# Patient Record
Sex: MALE | Race: WHITE | NOT HISPANIC OR LATINO | Employment: OTHER | URBAN - METROPOLITAN AREA
[De-identification: names, ages, dates, MRNs, and addresses within clinical notes are randomized per-mention and may not be internally consistent; named-entity substitution may affect disease eponyms.]

---

## 2017-02-22 ENCOUNTER — HOSPITAL ENCOUNTER (OUTPATIENT)
Dept: CARDIOLOGY | Facility: CLINIC | Age: 82
Discharge: HOME | End: 2017-02-22
Attending: INTERNAL MEDICINE | Admitting: INTERNAL MEDICINE
Payer: MEDICARE

## 2017-02-22 DIAGNOSIS — I47.19 ATRIAL TACHYCARDIA (H): ICD-10-CM

## 2017-02-22 PROCEDURE — 93227 XTRNL ECG REC<48 HR R&I: CPT | Performed by: INTERNAL MEDICINE

## 2017-02-22 PROCEDURE — 93225 XTRNL ECG REC<48 HRS REC: CPT | Performed by: INTERNAL MEDICINE

## 2017-03-03 ENCOUNTER — OFFICE VISIT (OUTPATIENT)
Dept: CARDIOLOGY | Facility: CLINIC | Age: 82
End: 2017-03-03
Attending: INTERNAL MEDICINE
Payer: MEDICARE

## 2017-03-03 VITALS
SYSTOLIC BLOOD PRESSURE: 131 MMHG | WEIGHT: 215 LBS | HEART RATE: 71 BPM | OXYGEN SATURATION: 97 % | DIASTOLIC BLOOD PRESSURE: 85 MMHG | BODY MASS INDEX: 30.85 KG/M2

## 2017-03-03 DIAGNOSIS — I47.19 ATRIAL TACHYCARDIA (H): Primary | ICD-10-CM

## 2017-03-03 PROCEDURE — 99213 OFFICE O/P EST LOW 20 MIN: CPT | Performed by: PHYSICIAN ASSISTANT

## 2017-03-03 NOTE — PATIENT INSTRUCTIONS
Thank you for your [facility] visit today. Your provider has recommended the following:  Medication Changes:  No change  Recommendations:  No testing at this time  Follow-up:  See Dr Tsai for cardiology follow up in 1 year.    We kindly ask that you call cardiology scheduling at [phone] three months prior to requested revisit date to schedule future cardiology appointments.  Reminder:  1. Please bring in your current medication list or your medication, over the counter supplements and vitamin bottles as we will review these at each office visit.               [facility] HEART CARE  [facility]~[address]~[place], [place]~[address]  Questions about your visit today?  Call your Cardiology Clinic RN's-Mary Paul and/or Janeen Martinez at [phone].

## 2017-03-03 NOTE — LETTER
3/3/2017    Hima Lott MD  [facility]   [address] [address]    RE: Vannesa Alvarez       Dear Colleague,    I had the pleasure of seeing Vannesa Alvarez in the [facility].    Mr. Alvarez is a pleasant 86-year-old gentleman who presents to Cardiology office to review the results of recent Holter monitor and for a 1-year followup.      The patient's cardiovascular history includes atrial tachyarrhythmias which may have previously caused an episode of syncope.  Since being placed on beta blockers, he has not had any recurrent syncopal episodes.      At this point, the patient states that he is doing quite well.  He has no cardiovascular symptoms.  He denies any palpitations, presyncope or syncope.  He exercises routinely on the treadmill and lifts some light weights.  He does not have any limitations with these activities.      He recently had a 24-hour Holter monitor which showed sinus arrhythmia with an average heart rate of 67 beats per minute, the minimum was 43, the maximum was 113.  He had rare isolated PVCs along with occasional isolated PACs.  He did have 9 SVT runs, the longest was 10 beats.      CURRENT CARDIAC MEDICATIONS:   1.  Toprol-XL 25 mg daily.   2.  Aspirin 81 mg daily.      The remainder of his medications, allergies and review of systems were reviewed and are as documented separately.      PHYSICAL EXAMINATION:   GENERAL:  The patient is a pleasant 86-year-old gentleman who appears his stated age.  He is in no apparent distress.   VITAL SIGNS:  His blood pressure is 130/85, pulse is 71, weight is 215 pounds.  This is overall stable.   LUNGS:  Clear to auscultation bilaterally.   CARDIAC:  Reveals a regular rate and rhythm, no murmurs appreciated.   ABDOMEN:  Soft, nontender, nondistended.   EXTREMITIES:  Lower extremities show no evidence of edema.      ASSESSMENT/PLAN:  The patient is a pleasant 86-year-old gentleman with a  history of paroxysmal atrial tachycardia.  He had an episode of syncope about 3 years ago now, which was felt possibly due to his tachycardia in the setting of dehydration.  He was placed on a beta blocker and has not had any recurrent symptoms since then.  He continues to do well from a cardiovascular standpoint and I have encouraged him to continue his current cardiac medication regimen unchanged.      He will follow up in the Cardiology Clinic in 1 year.  Of course, I encouraged him to contact us sooner with any questions or concerns.      Thank you for allowing us to participate in the care of this very pleasant patient.       Sincerely,    Ammy Harper PA-C     [facility]

## 2017-03-03 NOTE — MR AVS SNAPSHOT
After Visit Summary   3/3/2017    Vannesa Alvarez    MRN: [id number]           Patient Information     Date Of Birth          11/13/1930        Visit Information        Provider Department      3/3/2017 1:40 PM Ammy Harper PA-C [facility] PHYS[facility]ART AT [facility]        Today's Diagnoses     Atrial tachycardia (H)          Care Instructions    Thank you for your [facility] visit today. Your provider has recommended the following:  Medication Changes:  No change  Recommendations:  No testing at this time  Follow-up:  See Dr Tsai for cardiology follow up in 1 year.    We kindly ask that you call cardiology scheduling at [phone] three months prior to requested revisit date to schedule future cardiology appointments.  Reminder:  1. Please bring in your current medication list or your medication, over the counter supplements and vitamin bottles as we will review these at each office visit.               [facility]~[address] [place] [address]. 2nd Floor~[place], [place]~[address]  Questions about your visit today?  Call your Cardiology Clinic RN's-Mary Paul and/or Janeen Martinez at [phone].              Follow-ups after your visit        Your next 10 appointments already scheduled     Mar 03, 2017  1:40 PM CST   Return Visit with Ammy Harper PA-C   [facility] PHYSICIAN HEART AT [facility] ([facility])    [address] [address] [phone]              Who to contact     If you have questions or need follow up information about today's clinic visit or your schedule please contact [facility] AT [facility] directly at [phone].  Normal or non-critical lab and imaging results will be communicated to you by MyChart, letter or phone within 4 business days after the clinic has received the results. If you do not hear from us within 7  "days, please contact the clinic through [employer] or phone. If you have a critical or abnormal lab result, we will notify you by phone as soon as possible.  Submit refill requests through [employer] or call your pharmacy and they will forward the refill request to us. Please allow 3 business days for your refill to be completed.          Additional Information About Your Visit        [employer] Information     [employer] lets you send messages to your doctor, view your test results, renew your prescriptions, schedule appointments and more. To sign up, go to [email or web address] . Click on \"Log in\" on the left side of the screen, which will take you to the Welcome page. Then click on \"Sign up Now\" on the right side of the page.     You will be asked to enter the access code listed below, as well as some personal information. Please follow the directions to create your username and password.     Your access code is: [email or web address]  Expires: 2017  1:39 PM     Your access code will  in 90 days. If you need help or a new code, please call your [place] clinic or [phone].        Care EveryWhere ID     This is your Care EveryWhere ID. This could be used by other organizations to access your [place] medical records  [id number]        Your Vitals Were     Pulse Pulse Oximetry BMI (Body Mass Index)             71 97% 30.85 kg/m2          Blood Pressure from Last 3 Encounters:   17 131/85   16 123/80   04/20/15 129/87    Weight from Last 3 Encounters:   17 97.5 kg (215 lb)   16 98.9 kg (218 lb)   04/20/15 98.9 kg (218 lb)              We Performed the Following     Follow-Up with Cardiologist        Primary Care Provider Office Phone # Fax #    Hima Lott [phone]       [facility] [address] [address]        Thank you!     Thank you for choosing [facility] PHYSICIAN HEART AT [facility]  for your care. Our goal is always to " provide you with excellent care. Hearing back from our patients is one way we can continue to improve our services. Please take a few minutes to complete the written survey that you may receive in the mail after your visit with us. Thank you!             Your Updated Medication List - Protect others around you: Learn how to safely use, store and throw away your medicines at www.disposemymeds.org.          This list is accurate as of: 3/3/17  1:39 PM.  Always use your most recent med list.                   Brand Name Dispense Instructions for use    acetaminophen 325 MG tablet    TYLENOL    100 tablet    Take 2 tablets (650 mg) by mouth every 4 hours as needed for mild pain or fever       allopurinol 300 MG tablet    ZYLOPRIM     1 TABLET BY MOUTH DAILY       aspirin 81 MG chewable tablet     90 tablet    Take 1 tablet (81 mg) by mouth daily       cyanocobalamin 1000 MCG/ML injection    VITAMIN B12     Inject 1 mL into the muscle every 30 days Around the 8th. Gets at [facility]       LOZOL 2.5 MG tablet   Generic drug:  indapamide      1 TABLET BY MOUTH DAILY       metoprolol 25 MG 24 hr tablet    TOPROL-XL    90 tablet    Take 1 tablet (25 mg) by mouth daily every morning.       nystatin cream    MYCOSTATIN     Apply topically 2 times daily       potassium chloride 10 MEQ tablet    K-TAB,KLOR-CON    30 tablet    Take 1 tablet (10 mEq) by mouth daily       ZANTAC PO      Take 150 mg by mouth 2 times daily

## 2017-03-03 NOTE — PROGRESS NOTES
HISTORY OF PRESENT ILLNESS:  Mr. Alvarez is a pleasant 86-year-old gentleman who presents to Cardiology office to review the results of recent Holter monitor and for a 1-year followup.      The patient's cardiovascular history includes atrial tachyarrhythmias which may have previously caused an episode of syncope.  Since being placed on beta blockers, he has not had any recurrent syncopal episodes.      At this point, the patient states that he is doing quite well.  He has no cardiovascular symptoms.  He denies any palpitations, presyncope or syncope.  He exercises routinely on the treadmill and lifts some light weights.  He does not have any limitations with these activities.      He recently had a 24-hour Holter monitor which showed sinus arrhythmia with an average heart rate of 67 beats per minute, the minimum was 43, the maximum was 113.  He had rare isolated PVCs along with occasional isolated PACs.  He did have 9 SVT runs, the longest was 10 beats.      CURRENT CARDIAC MEDICATIONS:   1.  Toprol-XL 25 mg daily.   2.  Aspirin 81 mg daily.      The remainder of his medications, allergies and review of systems were reviewed and are as documented separately.      PHYSICAL EXAMINATION:   GENERAL:  The patient is a pleasant 86-year-old gentleman who appears his stated age.  He is in no apparent distress.   VITAL SIGNS:  His blood pressure is 130/85, pulse is 71, weight is 215 pounds.  This is overall stable.   LUNGS:  Clear to auscultation bilaterally.   CARDIAC:  Reveals a regular rate and rhythm, no murmurs appreciated.   ABDOMEN:  Soft, nontender, nondistended.   EXTREMITIES:  Lower extremities show no evidence of edema.      ASSESSMENT/PLAN:  The patient is a pleasant 86-year-old gentleman with a history of paroxysmal atrial tachycardia.  He had an episode of syncope about 3 years ago now, which was felt possibly due to his tachycardia in the setting of dehydration.  He was placed on a beta blocker and has not  had any recurrent symptoms since then.  He continues to do well from a cardiovascular standpoint and I have encouraged him to continue his current cardiac medication regimen unchanged.      He will follow up in the Cardiology Clinic in 1 year.  Of course, I encouraged him to contact us sooner with any questions or concerns.      Thank you for allowing us to participate in the care of this very pleasant patient.        cc:   Hima Lott MD   [address]  [address]         BOUBACAR RIVERA PA-C             D: 2017 13:48   T: 2017 15:46   MT: EMILI      Name:     MADISON LUKE   MRN:      [id number]        Account:      [id number]   :      1930           Service Date: 2017      Document: [id number]

## 2017-03-03 NOTE — PROGRESS NOTES
Please see separate dictation for HPI, PHYSICAL EXAM AND IMPRESSION/PLAN.    CURRENT MEDICATIONS:  Current Outpatient Prescriptions   Medication Sig Dispense Refill     metoprolol (TOPROL-XL) 25 MG 24 hr tablet Take 1 tablet (25 mg) by mouth daily every morning. 90 tablet 3     acetaminophen (TYLENOL) 325 MG tablet Take 2 tablets (650 mg) by mouth every 4 hours as needed for mild pain or fever 100 tablet      potassium chloride (K-DUR) 10 MEQ tablet Take 1 tablet (10 mEq) by mouth daily 30 tablet 0     aspirin 81 MG chewable tablet Take 1 tablet (81 mg) by mouth daily 90 tablet 3     cyanocobalamin 1000 MCG/ML injection Inject 1 mL into the muscle every 30 days Around the 8th. Gets at [facility]       nystatin (MYCOSTATIN) cream Apply topically 2 times daily       Ranitidine HCl (ZANTAC PO) Take 150 mg by mouth 2 times daily       ALLOPURINOL 300 MG OR TABS 1 TABLET BY MOUTH DAILY       LOZOL 2.5 MG OR TABS 1 TABLET BY MOUTH DAILY         ALLERGIES:     Allergies   Allergen Reactions     Latex Itching     Cipro [Ciprofloxacin] Itching and Rash       PAST MEDICAL HISTORY:  Past Medical History   Diagnosis Date     Cancer (H) 5/2014     Prostate Cancer, skin cancer     Gastro-oesophageal reflux disease      Syncope        PAST SURGICAL HISTORY:  No past surgical history on file.    SOCIAL HISTORY:  Social History     Social History     Marital status:      Spouse name: N/A     Number of children: N/A     Years of education: N/A     Social History Main Topics     Smoking status: Former Smoker     Quit date: 1/1/1969     Smokeless tobacco: Not on file     Alcohol use Not on file     Drug use: Not on file     Sexual activity: Not on file     Other Topics Concern     Not on file     Social History Narrative       FAMILY HISTORY:  No family history on file.    Review of Systems:  Skin:  Negative       Eyes:  Negative      ENT:  Negative      Respiratory:  Negative     Cardiovascular:  Negative for;chest  pain;palpitations;edema;fatigue;lightheadedness;dizziness;exercise intolerance syncope or near-syncope   Gastroenterology: Negative      Genitourinary:  Negative      Musculoskeletal:  Negative      Neurologic:  Positive for numbness or tingling of feet    Psychiatric:  Negative      Heme/Lymph/Imm:  Negative      Endocrine:  Negative         Reviewed. Remainder of the note dictated.    Ammy Harper PA-C

## 2017-08-28 DIAGNOSIS — I47.10 PAROXYSMAL SUPRAVENTRICULAR TACHYCARDIA (H): Primary | ICD-10-CM

## 2017-08-28 RX ORDER — METOPROLOL SUCCINATE 25 MG/1
25 TABLET, EXTENDED RELEASE ORAL DAILY
Qty: 90 TABLET | Refills: 2 | Status: SHIPPED | OUTPATIENT
Start: 2017-08-28 | End: 2018-05-29

## 2018-04-03 ENCOUNTER — APPOINTMENT (OUTPATIENT)
Dept: GENERAL RADIOLOGY | Facility: CLINIC | Age: 83
End: 2018-04-03
Attending: STUDENT IN AN ORGANIZED HEALTH CARE EDUCATION/TRAINING PROGRAM
Payer: MEDICARE

## 2018-04-03 ENCOUNTER — HOSPITAL ENCOUNTER (EMERGENCY)
Facility: CLINIC | Age: 83
Discharge: HOME | End: 2018-04-03
Attending: STUDENT IN AN ORGANIZED HEALTH CARE EDUCATION/TRAINING PROGRAM | Admitting: STUDENT IN AN ORGANIZED HEALTH CARE EDUCATION/TRAINING PROGRAM
Payer: MEDICARE

## 2018-04-03 VITALS
DIASTOLIC BLOOD PRESSURE: 90 MMHG | TEMPERATURE: 98.5 F | RESPIRATION RATE: 18 BRPM | SYSTOLIC BLOOD PRESSURE: 150 MMHG | HEART RATE: 98 BPM | OXYGEN SATURATION: 94 %
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
## 2018-04-03 DIAGNOSIS — R53.1 EPISODE OF GENERALIZED WEAKNESS: ICD-10-CM

## 2018-04-03 DIAGNOSIS — J20.9 ACUTE BRONCHITIS, UNSPECIFIED ORGANISM: ICD-10-CM

## 2018-04-03 LAB
ALBUMIN SERPL-MCNC: 3.4 G/DL (ref 3.4–5)
ALBUMIN UR-MCNC: NEGATIVE MG/DL
ALP SERPL-CCNC: 85 U/L (ref 40–150)
ALT SERPL W P-5'-P-CCNC: 18 U/L (ref 0–70)
ANION GAP SERPL CALCULATED.3IONS-SCNC: 5 MMOL/L (ref 3–14)
APPEARANCE UR: CLEAR
AST SERPL W P-5'-P-CCNC: 28 U/L (ref 0–45)
BASOPHILS # BLD AUTO: 0 10E9/L (ref 0–0.2)
BASOPHILS NFR BLD AUTO: 0.2 %
BILIRUB SERPL-MCNC: 0.7 MG/DL (ref 0.2–1.3)
BILIRUB UR QL STRIP: NEGATIVE
BUN SERPL-MCNC: 13 MG/DL (ref 7–30)
CALCIUM SERPL-MCNC: 8.4 MG/DL (ref 8.5–10.1)
CHLORIDE SERPL-SCNC: 99 MMOL/L (ref 94–109)
CO2 SERPL-SCNC: 32 MMOL/L (ref 20–32)
COLOR UR AUTO: YELLOW
CREAT SERPL-MCNC: 1.11 MG/DL (ref 0.66–1.25)
DIFFERENTIAL METHOD BLD: ABNORMAL
EOSINOPHIL # BLD AUTO: 0.1 10E9/L (ref 0–0.7)
EOSINOPHIL NFR BLD AUTO: 1.1 %
ERYTHROCYTE [DISTWIDTH] IN BLOOD BY AUTOMATED COUNT: 13.1 % (ref 10–15)
GFR SERPL CREATININE-BSD FRML MDRD: 63 ML/MIN/1.7M2
GLUCOSE SERPL-MCNC: 94 MG/DL (ref 70–99)
GLUCOSE UR STRIP-MCNC: NEGATIVE MG/DL
HCT VFR BLD AUTO: 40.7 % (ref 40–53)
HGB BLD-MCNC: 14.4 G/DL (ref 13.3–17.7)
HGB UR QL STRIP: NEGATIVE
IMM GRANULOCYTES # BLD: 0 10E9/L (ref 0–0.4)
IMM GRANULOCYTES NFR BLD: 0.6 %
KETONES UR STRIP-MCNC: NEGATIVE MG/DL
LEUKOCYTE ESTERASE UR QL STRIP: ABNORMAL
LYMPHOCYTES # BLD AUTO: 0.7 10E9/L (ref 0.8–5.3)
LYMPHOCYTES NFR BLD AUTO: 12.5 %
MCH RBC QN AUTO: 32.6 PG (ref 26.5–33)
MCHC RBC AUTO-ENTMCNC: 35.4 G/DL (ref 31.5–36.5)
MCV RBC AUTO: 92 FL (ref 78–100)
MONOCYTES # BLD AUTO: 0.8 10E9/L (ref 0–1.3)
MONOCYTES NFR BLD AUTO: 15.4 %
NEUTROPHILS # BLD AUTO: 3.8 10E9/L (ref 1.6–8.3)
NEUTROPHILS NFR BLD AUTO: 70.2 %
NITRATE UR QL: NEGATIVE
PH UR STRIP: 6 PH (ref 5–7)
PLATELET # BLD AUTO: 117 10E9/L (ref 150–450)
POTASSIUM SERPL-SCNC: 3.1 MMOL/L (ref 3.4–5.3)
PROT SERPL-MCNC: 7.5 G/DL (ref 6.8–8.8)
RBC # BLD AUTO: 4.42 10E12/L (ref 4.4–5.9)
RBC #/AREA URNS AUTO: 2 /HPF (ref 0–2)
SODIUM SERPL-SCNC: 136 MMOL/L (ref 133–144)
SOURCE: ABNORMAL
SP GR UR STRIP: 1.01 (ref 1–1.03)
TROPONIN I SERPL-MCNC: <0.015 UG/L (ref 0–0.04)
UROBILINOGEN UR STRIP-MCNC: 4 MG/DL (ref 0–2)
WBC # BLD AUTO: 5.4 10E9/L (ref 4–11)
WBC #/AREA URNS AUTO: 3 /HPF (ref 0–5)

## 2018-04-03 PROCEDURE — 25000125 ZZHC RX 250: Performed by: STUDENT IN AN ORGANIZED HEALTH CARE EDUCATION/TRAINING PROGRAM

## 2018-04-03 PROCEDURE — 80053 COMPREHEN METABOLIC PANEL: CPT | Performed by: STUDENT IN AN ORGANIZED HEALTH CARE EDUCATION/TRAINING PROGRAM

## 2018-04-03 PROCEDURE — 93005 ELECTROCARDIOGRAM TRACING: CPT | Performed by: STUDENT IN AN ORGANIZED HEALTH CARE EDUCATION/TRAINING PROGRAM

## 2018-04-03 PROCEDURE — A9270 NON-COVERED ITEM OR SERVICE: HCPCS | Mod: GY | Performed by: STUDENT IN AN ORGANIZED HEALTH CARE EDUCATION/TRAINING PROGRAM

## 2018-04-03 PROCEDURE — 96361 HYDRATE IV INFUSION ADD-ON: CPT | Performed by: STUDENT IN AN ORGANIZED HEALTH CARE EDUCATION/TRAINING PROGRAM

## 2018-04-03 PROCEDURE — 25000128 H RX IP 250 OP 636: Performed by: STUDENT IN AN ORGANIZED HEALTH CARE EDUCATION/TRAINING PROGRAM

## 2018-04-03 PROCEDURE — 99285 EMERGENCY DEPT VISIT HI MDM: CPT | Mod: 25 | Performed by: STUDENT IN AN ORGANIZED HEALTH CARE EDUCATION/TRAINING PROGRAM

## 2018-04-03 PROCEDURE — 84484 ASSAY OF TROPONIN QUANT: CPT | Performed by: STUDENT IN AN ORGANIZED HEALTH CARE EDUCATION/TRAINING PROGRAM

## 2018-04-03 PROCEDURE — 81001 URINALYSIS AUTO W/SCOPE: CPT | Performed by: STUDENT IN AN ORGANIZED HEALTH CARE EDUCATION/TRAINING PROGRAM

## 2018-04-03 PROCEDURE — 71046 X-RAY EXAM CHEST 2 VIEWS: CPT

## 2018-04-03 PROCEDURE — 93010 ELECTROCARDIOGRAM REPORT: CPT | Mod: Z6 | Performed by: STUDENT IN AN ORGANIZED HEALTH CARE EDUCATION/TRAINING PROGRAM

## 2018-04-03 PROCEDURE — 96360 HYDRATION IV INFUSION INIT: CPT | Performed by: STUDENT IN AN ORGANIZED HEALTH CARE EDUCATION/TRAINING PROGRAM

## 2018-04-03 PROCEDURE — 25000132 ZZH RX MED GY IP 250 OP 250 PS 637: Mod: GY | Performed by: STUDENT IN AN ORGANIZED HEALTH CARE EDUCATION/TRAINING PROGRAM

## 2018-04-03 PROCEDURE — 85025 COMPLETE CBC W/AUTO DIFF WBC: CPT | Performed by: STUDENT IN AN ORGANIZED HEALTH CARE EDUCATION/TRAINING PROGRAM

## 2018-04-03 RX ORDER — ONDANSETRON 4 MG/1
4 TABLET, ORALLY DISINTEGRATING ORAL ONCE
Status: COMPLETED | OUTPATIENT
Start: 2018-04-03 | End: 2018-04-03

## 2018-04-03 RX ORDER — INDAPAMIDE 2.5 MG/1
5 TABLET ORAL DAILY
COMMUNITY
Start: 2017-12-01

## 2018-04-03 RX ORDER — POTASSIUM CHLORIDE 1.5 G/1.58G
40 POWDER, FOR SOLUTION ORAL ONCE
Status: COMPLETED | OUTPATIENT
Start: 2018-04-03 | End: 2018-04-03

## 2018-04-03 RX ORDER — POTASSIUM CHLORIDE 750 MG/1
20 TABLET, EXTENDED RELEASE ORAL 3 TIMES DAILY
COMMUNITY
Start: 2017-11-01

## 2018-04-03 RX ORDER — AZITHROMYCIN 250 MG/1
TABLET, FILM COATED ORAL
Qty: 6 TABLET | Refills: 0 | Status: SHIPPED | OUTPATIENT
Start: 2018-04-03 | End: 2018-06-27

## 2018-04-03 RX ADMIN — SODIUM CHLORIDE, POTASSIUM CHLORIDE, SODIUM LACTATE AND CALCIUM CHLORIDE 500 ML: 600; 310; 30; 20 INJECTION, SOLUTION INTRAVENOUS at 12:43

## 2018-04-03 RX ADMIN — ONDANSETRON 4 MG: 4 TABLET, ORALLY DISINTEGRATING ORAL at 14:53

## 2018-04-03 RX ADMIN — POTASSIUM CHLORIDE 40 MEQ: 1.5 POWDER, FOR SOLUTION ORAL at 14:35

## 2018-04-03 NOTE — ED NOTES
Pt feeling nauseated after po potassium. zofran odt given. Pt wants to discharge and will return if needed.

## 2018-04-03 NOTE — ED AVS SNAPSHOT
[facility] Emergency Department    [address] [address]    Phone:  [phone]    Fax:  [phone]                                       Vannesa Alvarez   MRN: [id number]    Department:  [facility] Emergency Department   Date of Visit:  4/3/2018           Patient Information     Date Of Birth          11/13/1930        Your diagnoses for this visit were:     Acute bronchitis, unspecified organism     Episode of generalized weakness     Fall, initial encounter        You were seen by Lamont Medrano DO.      Follow-up Information     Follow up with Hima Lott MD. Schedule an appointment as soon as possible for a visit in 2 days.    Specialty:  Family Practice    Why:  Followup for reevaluation and managment plan.    Contact information:    [facility]  [address] [id number] [phone]        Discharge References/Attachments     BRONCHITIS, ANTIBIOTIC TREATMENT (ADULT) (ENGLISH)    FALL, UNCERTAIN CAUSE (ENGLISH)      24 Hour Appointment Hotline       To make an appointment at any [facility], call [phone] ([phone]). If you don't have a family doctor or clinic, we will help you find one. [facility] are conveniently located to serve the needs of you and your family.             Review of your medicines      START taking        Dose / Directions Last dose taken    azithromycin 250 MG tablet   Commonly known as:  ZITHROMAX   Quantity:  6 tablet        Two tablets first day, then one tablet daily for four days.   Refills:  0          Our records show that you are taking the medicines listed below. If these are incorrect, please call your family doctor or clinic.        Dose / Directions Last dose taken    acetaminophen 325 MG tablet   Commonly known as:  TYLENOL   Dose:  650 mg   Quantity:  100 tablet        Take 2 tablets (650 mg) by mouth every 4 hours as needed for mild pain or fever   Refills:  0        allopurinol 300 MG  tablet   Commonly known as:  ZYLOPRIM        1 TABLET BY MOUTH DAILY   Refills:  0        aspirin 81 MG chewable tablet   Dose:  81 mg   Quantity:  90 tablet        Take 1 tablet (81 mg) by mouth daily   Refills:  3        cyanocobalamin 1000 MCG/ML injection   Commonly known as:  VITAMIN B12   Dose:  1 mL        Inject 1 mL into the muscle every 30 days Around the 8th. Gets at [facility]   Refills:  0        indapamide 2.5 MG tablet   Commonly known as:  LOZOL   Dose:  5 mg        Take 5 mg by mouth daily   Refills:  0        metoprolol succinate 25 MG 24 hr tablet   Commonly known as:  TOPROL-XL   Dose:  25 mg   Quantity:  90 tablet        Take 1 tablet (25 mg) by mouth daily every morning.   Refills:  2        nystatin cream   Commonly known as:  MYCOSTATIN        Apply topically 2 times daily   Refills:  0        OMEPRAZOLE PO   Dose:  20 mg        Take 20 mg by mouth 2 times daily (before meals)   Refills:  0        potassium chloride SA 10 MEQ CR tablet   Commonly known as:  K-DUR/KLOR-CON M   Dose:  20 mEq        Take 20 mEq by mouth 2 times daily (with meals)   Refills:  0                Prescriptions were sent or printed at these locations (1 Prescription)                   [id number] [address]   [address] [place] [address]    Telephone:  [phone]   Fax:  [phone]   Hours:  Closed 10-16-08 business to [facility]                E-Prescribed (1 of 1)         azithromycin (ZITHROMAX) 250 MG tablet                Procedures and tests performed during your visit     CBC with platelets differential    Cardiac Continuous Monitoring    Comprehensive metabolic panel    EKG 12 lead    Peripheral IV: Standard    Pulse oximetry nursing    Troponin I    UA reflex to Microscopic and Culture    Vital signs    XR Chest 2 Views      Orders Needing Specimen Collection     None      Pending Results     No orders found from 4/1/2018 to 4/4/2018.             Pending Culture Results     No orders found from 4/1/2018 to 4/4/2018.            Pending Results Instructions     If you had any lab results that were not finalized at the time of your Discharge, you can call the ED Lab Result RN at [phone]. You will be contacted by this team for any positive Lab results or changes in treatment. The nurses are available 7 days a week from 10A to 6:30P.  You can leave a message 24 hours per day and they will return your call.        Test Results From Your Hospital Stay        4/3/2018  1:00 PM      Component Results     Component Value Ref Range & Units Status    WBC 5.4 4.0 - 11.0 10e9/L Final    RBC Count 4.42 4.4 - 5.9 10e12/L Final    Hemoglobin 14.4 13.3 - 17.7 g/dL Final    Hematocrit 40.7 40.0 - 53.0 % Final    MCV 92 78 - 100 fl Final    MCH 32.6 26.5 - 33.0 pg Final    MCHC 35.4 31.5 - 36.5 g/dL Final    RDW 13.1 10.0 - 15.0 % Final    Platelet Count 117 (L) 150 - 450 10e9/L Final    Diff Method Automated Method  Final    % Neutrophils 70.2 % Final    % Lymphocytes 12.5 % Final    % Monocytes 15.4 % Final    % Eosinophils 1.1 % Final    % Basophils 0.2 % Final    % Immature Granulocytes 0.6 % Final    Absolute Neutrophil 3.8 1.6 - 8.3 10e9/L Final    Absolute Lymphocytes 0.7 (L) 0.8 - 5.3 10e9/L Final    Absolute Monocytes 0.8 0.0 - 1.3 10e9/L Final    Absolute Eosinophils 0.1 0.0 - 0.7 10e9/L Final    Absolute Basophils 0.0 0.0 - 0.2 10e9/L Final    Abs Immature Granulocytes 0.0 0 - 0.4 10e9/L Final         4/3/2018  1:21 PM      Component Results     Component Value Ref Range & Units Status    Sodium 136 133 - 144 mmol/L Final    Potassium 3.1 (L) 3.4 - 5.3 mmol/L Final    Chloride 99 94 - 109 mmol/L Final    Carbon Dioxide 32 20 - 32 mmol/L Final    Anion Gap 5 3 - 14 mmol/L Final    Glucose 94 70 - 99 mg/dL Final    Urea Nitrogen 13 7 - 30 mg/dL Final    Creatinine 1.11 0.66 - 1.25 mg/dL Final    GFR Estimate 63 >60 mL/min/1.7m2 Final    Non African American GFR  Calc    GFR Estimate If Black 76 >60 mL/min/1.7m2 Final    African American GFR Calc    Calcium 8.4 (L) 8.5 - 10.1 mg/dL Final    Bilirubin Total 0.7 0.2 - 1.3 mg/dL Final    Albumin 3.4 3.4 - 5.0 g/dL Final    Protein Total 7.5 6.8 - 8.8 g/dL Final    Alkaline Phosphatase 85 40 - 150 U/L Final    ALT 18 0 - 70 U/L Final    AST 28 0 - 45 U/L Final         4/3/2018  1:21 PM      Component Results     Component Value Ref Range & Units Status    Troponin I ES <0.015 0.000 - 0.045 ug/L Final    The 99th percentile for upper reference range is 0.045 ug/L.  Troponin values   in the range of 0.045 - 0.120 ug/L may be associated with risks of adverse   clinical events.           4/3/2018  1:30 PM      Narrative     XR CHEST 2 VW 4/3/2018 1:10 PM    HISTORY: Cough. Fever.  Fall.    COMPARISON: 11/2/2014.        Impression     IMPRESSION: 2 views of the chest show no acute cardiopulmonary disease  and no significant change.     WATSON HERRERA MD         4/3/2018  1:57 PM      Component Results     Component Value Ref Range & Units Status    Color Urine Yellow  Final    Appearance Urine Clear  Final    Glucose Urine Negative NEG^Negative mg/dL Final    Bilirubin Urine Negative NEG^Negative Final    Ketones Urine Negative NEG^Negative mg/dL Final    Specific Gravity Urine 1.015 1.003 - 1.035 Final    Blood Urine Negative NEG^Negative Final    pH Urine 6.0 5.0 - 7.0 pH Final    Protein Albumin Urine Negative NEG^Negative mg/dL Final    Urobilinogen mg/dL 4.0 (H) 0.0 - 2.0 mg/dL Final    Nitrite Urine Negative NEG^Negative Final    Leukocyte Esterase Urine Small (A) NEG^Negative Final    Source Midstream Urine  Final    RBC Urine 2 0 - 2 /HPF Final    WBC Urine 3 0 - 5 /HPF Final                Thank you for choosing [facility]       Thank you for choosing [facility] for your care. Our goal is always to provide you with excellent care. Hearing back from our patients is one way we can continue to improve our services. Please take a few  "minutes to complete the written survey that you may receive in the mail after you visit with us. Thank you!        Take the [employer] Information     [facility] lets you send messages to your doctor, view your test results, renew your prescriptions, schedule appointments and more. To sign up, go to [email or web address] . Click on \"Log in\" on the left side of the screen, which will take you to the Welcome page. Then click on \"Sign up Now\" on the right side of the page.     You will be asked to enter the access code listed below, as well as some personal information. Please follow the directions to create your username and password.     Your access code is: [email or web address]  Expires: 2018  2:41 PM     Your access code will  in 90 days. If you need help or a new code, please call your [place] clinic or [phone].        Care EveryWhere ID     This is your Care EveryWhere ID. This could be used by other organizations to access your [place] medical records  [id number]        Equal Access to Services     [facility]: Hadii danielle Collins, waaxda lukamla, qaybta kaalmairina abbott, grant doyle . So [facility] [phone].    ATENCIÓN: Si habla español, tiene a robertson disposición servicios gratuitos de asistencia lingüística. Llame al [phone].    We comply with applicable federal civil rights laws and [place] laws. We do not discriminate on the basis of race, color, national origin, age, disability, sex, sexual orientation, or gender identity.            After Visit Summary       This is your record. Keep this with you and show to your community pharmacist(s) and doctor(s) at your next visit.                  "

## 2018-04-03 NOTE — ED AVS SNAPSHOT
[facility] Emergency Department    [address] [address]    Phone:  [phone]    Fax:  [phone]                                       Vannesa Alvarez   MRN: [id number]    Department:  [facility] Emergency Department   Date of Visit:  4/3/2018           After Visit Summary Signature Page     I have received my discharge instructions, and my questions have been answered. I have discussed any challenges I see with this plan with the nurse or doctor.    ..........................................................................................................................................  Patient/Patient Representative Signature      ..........................................................................................................................................  Patient Representative Print Name and Relationship to Patient    ..................................................               ................................................  Date                                            Time    ..........................................................................................................................................  Reviewed by Signature/Title    ...................................................              ..............................................  Date                                                            Time

## 2018-05-29 DIAGNOSIS — I47.10 PAROXYSMAL SUPRAVENTRICULAR TACHYCARDIA (H): ICD-10-CM

## 2018-05-29 RX ORDER — METOPROLOL SUCCINATE 25 MG/1
25 TABLET, EXTENDED RELEASE ORAL DAILY
Qty: 90 TABLET | Refills: 0 | Status: SHIPPED | OUTPATIENT
Start: 2018-05-29 | End: 2018-08-27

## 2018-06-27 ENCOUNTER — OFFICE VISIT (OUTPATIENT)
Dept: CARDIOLOGY | Facility: CLINIC | Age: 83
End: 2018-06-27
Attending: PHYSICIAN ASSISTANT
Payer: MEDICARE

## 2018-06-27 VITALS
SYSTOLIC BLOOD PRESSURE: 141 MMHG | WEIGHT: 210 LBS | OXYGEN SATURATION: 96 % | HEART RATE: 71 BPM | BODY MASS INDEX: 30.13 KG/M2 | DIASTOLIC BLOOD PRESSURE: 91 MMHG

## 2018-06-27 DIAGNOSIS — I47.19 ATRIAL TACHYCARDIA (H): Primary | ICD-10-CM

## 2018-06-27 DIAGNOSIS — R55 SYNCOPE, UNSPECIFIED SYNCOPE TYPE: ICD-10-CM

## 2018-06-27 PROCEDURE — 99213 OFFICE O/P EST LOW 20 MIN: CPT | Performed by: INTERNAL MEDICINE

## 2018-06-27 NOTE — LETTER
6/27/2018    Hima Lott MD  [facility] [address] [address]    RE: Vannesa Alvarez       Dear Colleague,    I had the pleasure of seeing Vannesa Alvarez in the [facility].    HPI and Plan:   See dictation    Orders Placed This Encounter   Procedures     Follow-Up with Cardiologist     Holter Monitor 24 hour - Adult       No orders of the defined types were placed in this encounter.      Medications Discontinued During This Encounter   Medication Reason     azithromycin (ZITHROMAX) 250 MG tablet          Encounter Diagnoses   Name Primary?     Atrial tachycardia (H) Yes     Syncope, unspecified syncope type        CURRENT MEDICATIONS:  Current Outpatient Prescriptions   Medication Sig Dispense Refill     ALLOPURINOL 300 MG OR TABS 1 TABLET BY MOUTH DAILY       aspirin 81 MG chewable tablet Take 1 tablet (81 mg) by mouth daily 90 tablet 3     cyanocobalamin 1000 MCG/ML injection Inject 1 mL into the muscle every 30 days Around the 8th. Gets at [facility]       indapamide (LOZOL) 2.5 MG tablet Take 5 mg by mouth daily       metoprolol succinate (TOPROL-XL) 25 MG 24 hr tablet Take 1 tablet (25 mg) by mouth daily every morning. 90 tablet 0     nystatin (MYCOSTATIN) cream Apply topically 2 times daily       OMEPRAZOLE PO Take 20 mg by mouth 2 times daily (before meals)       potassium chloride SA (K-DUR/KLOR-CON M) 10 MEQ CR tablet Take 20 mEq by mouth 3 times daily        acetaminophen (TYLENOL) 325 MG tablet Take 2 tablets (650 mg) by mouth every 4 hours as needed for mild pain or fever (Patient not taking: Reported on 6/27/2018) 100 tablet        ALLERGIES     Allergies   Allergen Reactions     Latex Itching     Cipro [Ciprofloxacin] Itching and Rash       PAST MEDICAL HISTORY:  Past Medical History:   Diagnosis Date     Cancer (H) 5/2014    Prostate Cancer, skin cancer     Gastro-oesophageal reflux disease      Syncope        PAST SURGICAL  HISTORY:  No past surgical history on file.    FAMILY HISTORY:  No family history on file.    SOCIAL HISTORY:  Social History     Social History     Marital status:      Spouse name: N/A     Number of children: N/A     Years of education: N/A     Social History Main Topics     Smoking status: Former Smoker     Quit date: 1/1/1969     Smokeless tobacco: Never Used     Alcohol use None     Drug use: None     Sexual activity: Not Asked     Other Topics Concern     None     Social History Narrative       Review of Systems:  Skin:  Negative       Eyes:  Negative      ENT:  Negative      Respiratory:  Positive for cough     Cardiovascular:  chest pain;Negative for;palpitations;edema;fatigue;lightheadedness;dizziness;exercise intolerance;syncope or near-syncope      Gastroenterology: Negative      Genitourinary:  Negative      Musculoskeletal:  Negative      Neurologic:  Positive for numbness or tingling of feet    Psychiatric:  Negative      Heme/Lymph/Imm:  Negative      Endocrine:  Negative        Physical Exam:  Vitals: BP (!) 141/91  Pulse 71  Wt 95.3 kg (210 lb)  SpO2 96%  BMI 30.13 kg/m2    Constitutional:  cooperative;in no acute distress        Skin:  warm and dry to the touch          Head:  normocephalic        Eyes:  sclera white        Lymph:No Cervical lymphadenopathy present     ENT:  no pallor or cyanosis        Neck:  no stiffness        Respiratory:  clear to auscultation         Cardiac: regular rhythm;normal S1 and S2       systolic ejection murmur        pulses full and equal                                        GI:  abdomen soft        Extremities and Muscular Skeletal:  no edema              Neurological:  affect appropriate;no gross motor deficits        Psych:  Alert and Oriented x 3        CC  Ammy Harper PA-C  [address] [place] [address]                Thank you for allowing me to participate in the care of your patient.      Sincerely,     Bill Tsai MD      [facility]    cc:   Ammy Harper PA-C  [address] [place] [address]

## 2018-06-27 NOTE — PROGRESS NOTES
Service Date: 2018      HISTORY OF PRESENT ILLNESS:  Mr. Luke is a pleasant 87-year-old gentleman with a history of atrial tachyarrhythmias which previously caused an episode of syncope along with dehydration.  Since being placed on beta blockers, he has had no recurrent syncopal episodes.  He returns in annual followup.      The patient has done well over the last 1 year.  We did not perform a monitor prior to today's visit.  He has had no recurrent syncope.  He has had no palpitations.  On exam, his heart rate is regular.  He denies any chest pain, pressure, shortness of breath, orthopnea or paroxysmal nocturnal dyspnea.      His blood pressure is slightly elevated in the office today at 141/91.  However, he states that at home his blood pressure is typically 130 or less.      Please see my separate note with his full physical examination.      IMPRESSION AND PLAN:  Mr. Luke is a pleasant 87-year-old gentleman with atrial tachyarrhythmias resulting in syncope in the past.  He has had no recurrent syncopal episodes and has had no recurrent significant atrial arrhythmia since being placed on low dose beta blocker therapy.  I will continue his beta blocker unchanged at the present time.  I will ask Vannesa to come back in 1 year with a Holter monitor to be completed at that time.         HILL GREEN MD             D: 2018   T: 2018   MT: EMILI      Name:     VANNESA LUKE   MRN:      [id number]        Account:      [id number]   :      1930           Service Date: 2018      Document: [id number]

## 2018-06-27 NOTE — MR AVS SNAPSHOT
After Visit Summary   6/27/2018    Vannesa Alvarez    MRN: [id number]           Patient Information     Date Of Birth          11/13/1930        Visit Information        Provider Department      6/27/2018 2:30 PM Bill Tsai MD [facility]        Today's Diagnoses     Atrial tachycardia (H)    -  1    Syncope, unspecified syncope type           Follow-ups after your visit        Additional Services     Follow-Up with Cardiologist                 Your next 10 appointments already scheduled     Jun 27, 2018  2:30 PM CDT   Return Visit with Bill Tsai MD   [facility] ([facility])    [address] [address]   [phone]              Future tests that were ordered for you today     Open Future Orders        Priority Expected Expires Ordered    Holter Monitor 24 hour - Adult Routine 6/27/2019 6/28/2019 6/27/2018    Follow-Up with Cardiologist Routine 6/27/2019 6/28/2019 6/27/2018            Who to contact     If you have questions or need follow up information about today's clinic visit or your schedule please contact [facility] directly at [phone].  Normal or non-critical lab and imaging results will be communicated to you by MyChart, letter or phone within 4 business days after the clinic has received the results. If you do not hear from us within 7 days, please contact the clinic through MyChart or phone. If you have a critical or abnormal lab result, we will notify you by phone as soon as possible.  Submit refill requests through [facility] or call your pharmacy and they will forward the refill request to us. Please allow 3 business days for your refill to be completed.          Additional Information About Your Visit        Care EveryWhere ID     This is your Care EveryWhere ID. This could be used by other organizations to access your [facility]  medical records  [id number]        Your Vitals Were     Pulse Pulse Oximetry BMI (Body Mass Index)             71 96% 30.13 kg/m2          Blood Pressure from Last 3 Encounters:   06/27/18 (!) 141/91   04/03/18 150/90   03/03/17 131/85    Weight from Last 3 Encounters:   06/27/18 95.3 kg (210 lb)   03/03/17 97.5 kg (215 lb)   03/30/16 98.9 kg (218 lb)              We Performed the Following     Follow-Up with Cardiologist          Today's Medication Changes          These changes are accurate as of 6/27/18  2:26 PM.  If you have any questions, ask your nurse or doctor.               Stop taking these medicines if you haven't already. Please contact your care team if you have questions.     azithromycin 250 MG tablet   Commonly known as:  ZITHROMAX   Stopped by:  Bill Tsai MD                    Primary Care Provider Office Phone # Fax #    Hima Lott [phone]       [place] [address] [place] [address]        Equal Access to Services     [facility]: Hadii danielle de jesus Sogris, waaxda luqadaha, qaybta kaalmairina abbott, grant doyle . So [facility] [phone].    ATENCIÓN: Si habla español, tiene a robertson disposición servicios gratuitos de asistencia lingüística. Llame al [phone].    We comply with applicable federal civil rights laws and [place] laws. We do not discriminate on the basis of race, color, national origin, age, disability, sex, sexual orientation, or gender identity.            Thank you!     Thank you for choosing [facility]  for your care. Our goal is always to provide you with excellent care. Hearing back from our patients is one way we can continue to improve our services. Please take a few minutes to complete the written survey that you may receive in the mail after your visit with us. Thank you!             Your Updated Medication List - Protect others around you: Learn  how to safely use, store and throw away your medicines at www.disposemymeds.org.          This list is accurate as of 6/27/18  2:26 PM.  Always use your most recent med list.                   Brand Name Dispense Instructions for use Diagnosis    acetaminophen 325 MG tablet    TYLENOL    100 tablet    Take 2 tablets (650 mg) by mouth every 4 hours as needed for mild pain or fever    Pneumonia, BPH (benign prostatic hypertrophy)       allopurinol 300 MG tablet    ZYLOPRIM     1 TABLET BY MOUTH DAILY        aspirin 81 MG chewable tablet     90 tablet    Take 1 tablet (81 mg) by mouth daily    Other specified cardiac dysrhythmias(427.89)       cyanocobalamin 1000 MCG/ML injection    VITAMIN B12     Inject 1 mL into the muscle every 30 days Around the 8th. Gets at [facility]        indapamide 2.5 MG tablet    LOZOL     Take 5 mg by mouth daily        metoprolol succinate 25 MG 24 hr tablet    TOPROL-XL    90 tablet    Take 1 tablet (25 mg) by mouth daily every morning.    Paroxysmal supraventricular tachycardia (H)       nystatin cream    MYCOSTATIN     Apply topically 2 times daily        OMEPRAZOLE PO      Take 20 mg by mouth 2 times daily (before meals)        potassium chloride SA 10 MEQ CR tablet    K-DUR/KLOR-CON M     Take 20 mEq by mouth 3 times daily

## 2018-06-27 NOTE — LETTER
2018      Hima Lott MD  [facility] [address] [address]      RE: Madison Luke       Dear Colleague,    I had the pleasure of seeing Madison Luke in the [facility].    Service Date: 2018      HISTORY OF PRESENT ILLNESS:  Mr. Luke is a pleasant 87-year-old gentleman with a history of atrial tachyarrhythmias which previously caused an episode of syncope along with dehydration.  Since being placed on beta blockers, he has had no recurrent syncopal episodes.  He returns in annual followup.      The patient has done well over the last 1 year.  We did not perform a monitor prior to today's visit.  He has had no recurrent syncope.  He has had no palpitations.  On exam, his heart rate is regular.  He denies any chest pain, pressure, shortness of breath, orthopnea or paroxysmal nocturnal dyspnea.      His blood pressure is slightly elevated in the office today at 141/91.  However, he states that at home his blood pressure is typically 130 or less.      Please see my separate note with his full physical examination.      IMPRESSION AND PLAN:  Mr. Luke is a pleasant 87-year-old gentleman with atrial tachyarrhythmias resulting in syncope in the past.  He has had no recurrent syncopal episodes and has had no recurrent significant atrial arrhythmia since being placed on low dose beta blocker therapy.  I will continue his beta blocker unchanged at the present time.  I will ask Madison to come back in 1 year with a Holter monitor to be completed at that time.         HILL GREEN MD             D: 2018   T: 2018   MT: EMILI      Name:     MADISON LUKE   MRN:      [id number]        Account:      [id number]   :      1930           Service Date: 2018      Document: [id number]      Outpatient Encounter Prescriptions as of 2018   Medication Sig Dispense Refill     ALLOPURINOL 300 MG OR TABS 1 TABLET BY MOUTH DAILY        aspirin 81 MG chewable tablet Take 1 tablet (81 mg) by mouth daily 90 tablet 3     cyanocobalamin 1000 MCG/ML injection Inject 1 mL into the muscle every 30 days Around the 8th. Gets at [facility]       indapamide (LOZOL) 2.5 MG tablet Take 5 mg by mouth daily       metoprolol succinate (TOPROL-XL) 25 MG 24 hr tablet Take 1 tablet (25 mg) by mouth daily every morning. 90 tablet 0     nystatin (MYCOSTATIN) cream Apply topically 2 times daily       OMEPRAZOLE PO Take 20 mg by mouth 2 times daily (before meals)       potassium chloride SA (K-DUR/KLOR-CON M) 10 MEQ CR tablet Take 20 mEq by mouth 3 times daily        acetaminophen (TYLENOL) 325 MG tablet Take 2 tablets (650 mg) by mouth every 4 hours as needed for mild pain or fever (Patient not taking: Reported on 6/27/2018) 100 tablet      [DISCONTINUED] azithromycin (ZITHROMAX) 250 MG tablet Two tablets first day, then one tablet daily for four days. 6 tablet 0     No facility-administered encounter medications on file as of 6/27/2018.      Again, thank you for allowing me to participate in the care of your patient.      Sincerely,    Bill Tsai MD     [facility]

## 2018-06-27 NOTE — PROGRESS NOTES
HPI and Plan:   See dictation    Orders Placed This Encounter   Procedures     Follow-Up with Cardiologist     Holter Monitor 24 hour - Adult       No orders of the defined types were placed in this encounter.      Medications Discontinued During This Encounter   Medication Reason     azithromycin (ZITHROMAX) 250 MG tablet          Encounter Diagnoses   Name Primary?     Atrial tachycardia (H) Yes     Syncope, unspecified syncope type        CURRENT MEDICATIONS:  Current Outpatient Prescriptions   Medication Sig Dispense Refill     ALLOPURINOL 300 MG OR TABS 1 TABLET BY MOUTH DAILY       aspirin 81 MG chewable tablet Take 1 tablet (81 mg) by mouth daily 90 tablet 3     cyanocobalamin 1000 MCG/ML injection Inject 1 mL into the muscle every 30 days Around the 8th. Gets at [facility]       indapamide (LOZOL) 2.5 MG tablet Take 5 mg by mouth daily       metoprolol succinate (TOPROL-XL) 25 MG 24 hr tablet Take 1 tablet (25 mg) by mouth daily every morning. 90 tablet 0     nystatin (MYCOSTATIN) cream Apply topically 2 times daily       OMEPRAZOLE PO Take 20 mg by mouth 2 times daily (before meals)       potassium chloride SA (K-DUR/KLOR-CON M) 10 MEQ CR tablet Take 20 mEq by mouth 3 times daily        acetaminophen (TYLENOL) 325 MG tablet Take 2 tablets (650 mg) by mouth every 4 hours as needed for mild pain or fever (Patient not taking: Reported on 6/27/2018) 100 tablet        ALLERGIES     Allergies   Allergen Reactions     Latex Itching     Cipro [Ciprofloxacin] Itching and Rash       PAST MEDICAL HISTORY:  Past Medical History:   Diagnosis Date     Cancer (H) 5/2014    Prostate Cancer, skin cancer     Gastro-oesophageal reflux disease      Syncope        PAST SURGICAL HISTORY:  No past surgical history on file.    FAMILY HISTORY:  No family history on file.    SOCIAL HISTORY:  Social History     Social History     Marital status:      Spouse name: N/A     Number of children: N/A     Years of education: N/A      Social History Main Topics     Smoking status: Former Smoker     Quit date: 1/1/1969     Smokeless tobacco: Never Used     Alcohol use None     Drug use: None     Sexual activity: Not Asked     Other Topics Concern     None     Social History Narrative       Review of Systems:  Skin:  Negative       Eyes:  Negative      ENT:  Negative      Respiratory:  Positive for cough     Cardiovascular:  chest pain;Negative for;palpitations;edema;fatigue;lightheadedness;dizziness;exercise intolerance;syncope or near-syncope      Gastroenterology: Negative      Genitourinary:  Negative      Musculoskeletal:  Negative      Neurologic:  Positive for numbness or tingling of feet    Psychiatric:  Negative      Heme/Lymph/Imm:  Negative      Endocrine:  Negative        Physical Exam:  Vitals: BP (!) 141/91  Pulse 71  Wt 95.3 kg (210 lb)  SpO2 96%  BMI 30.13 kg/m2    Constitutional:  cooperative;in no acute distress        Skin:  warm and dry to the touch          Head:  normocephalic        Eyes:  sclera white        Lymph:No Cervical lymphadenopathy present     ENT:  no pallor or cyanosis        Neck:  no stiffness        Respiratory:  clear to auscultation         Cardiac: regular rhythm;normal S1 and S2       systolic ejection murmur        pulses full and equal                                        GI:  abdomen soft        Extremities and Muscular Skeletal:  no edema              Neurological:  affect appropriate;no gross motor deficits        Psych:  Alert and Oriented x 3        CC  Ammy Harper PA-C  [address] [place] [address]

## 2018-08-27 DIAGNOSIS — I47.10 PAROXYSMAL SUPRAVENTRICULAR TACHYCARDIA (H): ICD-10-CM

## 2018-08-27 RX ORDER — METOPROLOL SUCCINATE 25 MG/1
25 TABLET, EXTENDED RELEASE ORAL DAILY
Qty: 90 TABLET | Refills: 3 | Status: SHIPPED | OUTPATIENT
Start: 2018-08-27 | End: 2022-03-25 | Stop reason: ALTCHOICE

## 2019-08-29 ENCOUNTER — HOSPITAL ENCOUNTER (OUTPATIENT)
Dept: CARDIOLOGY | Facility: CLINIC | Age: 84
Discharge: HOME | End: 2019-08-29
Attending: FAMILY MEDICINE | Admitting: FAMILY MEDICINE
Payer: MEDICARE

## 2019-08-29 DIAGNOSIS — I47.19 ATRIAL TACHYCARDIA (H): ICD-10-CM

## 2019-08-29 PROCEDURE — 93227 XTRNL ECG REC<48 HR R&I: CPT | Performed by: INTERNAL MEDICINE

## 2019-08-29 PROCEDURE — 93225 XTRNL ECG REC<48 HRS REC: CPT

## 2019-09-03 NOTE — RESULT ENCOUNTER NOTE
Results noted: principle rhythm sinus  with avg HR 66; 742 isolated SVE beats and 52 SVE runs the longest of which was 8 beats; 2019 isolated VEs (2% burden); patient made four diary entries/button presses while wearing the monitor, but did not mention any symptoms. To be discussed at  with Dr Khan on 9/4/19.

## 2019-09-04 ENCOUNTER — OFFICE VISIT (OUTPATIENT)
Dept: CARDIOLOGY | Facility: CLINIC | Age: 84
End: 2019-09-04
Payer: MEDICARE

## 2019-09-04 ENCOUNTER — HOSPITAL ENCOUNTER (EMERGENCY)
Facility: CLINIC | Age: 84
Discharge: HOME | End: 2019-09-04
Attending: NURSE PRACTITIONER | Admitting: NURSE PRACTITIONER
Payer: MEDICARE

## 2019-09-04 VITALS
WEIGHT: 204 LBS | TEMPERATURE: 97.3 F | DIASTOLIC BLOOD PRESSURE: 82 MMHG | HEIGHT: 69 IN | SYSTOLIC BLOOD PRESSURE: 146 MMHG | OXYGEN SATURATION: 97 % | BODY MASS INDEX: 30.21 KG/M2

## 2019-09-04 VITALS
BODY MASS INDEX: 29.33 KG/M2 | HEART RATE: 67 BPM | WEIGHT: 204.4 LBS | OXYGEN SATURATION: 94 % | SYSTOLIC BLOOD PRESSURE: 117 MMHG | DIASTOLIC BLOOD PRESSURE: 70 MMHG

## 2019-09-04 DIAGNOSIS — S91.209A AVULSION OF TOENAIL OF LEFT FOOT: ICD-10-CM

## 2019-09-04 DIAGNOSIS — I47.19 ATRIAL TACHYCARDIA (H): ICD-10-CM

## 2019-09-04 PROCEDURE — 11730 AVULSION NAIL PLATE SIMPLE 1: CPT | Mod: TA | Performed by: NURSE PRACTITIONER

## 2019-09-04 PROCEDURE — 25000132 ZZH RX MED GY IP 250 OP 250 PS 637: Mod: GY | Performed by: NURSE PRACTITIONER

## 2019-09-04 PROCEDURE — 99284 EMERGENCY DEPT VISIT MOD MDM: CPT | Mod: 25 | Performed by: NURSE PRACTITIONER

## 2019-09-04 PROCEDURE — 99283 EMERGENCY DEPT VISIT LOW MDM: CPT | Mod: 25 | Performed by: NURSE PRACTITIONER

## 2019-09-04 PROCEDURE — 99213 OFFICE O/P EST LOW 20 MIN: CPT | Performed by: INTERNAL MEDICINE

## 2019-09-04 RX ORDER — ACETAMINOPHEN 325 MG/1
975 TABLET ORAL ONCE
Status: COMPLETED | OUTPATIENT
Start: 2019-09-04 | End: 2019-09-04

## 2019-09-04 RX ADMIN — ACETAMINOPHEN 975 MG: 325 TABLET, FILM COATED ORAL at 10:54

## 2019-09-04 ASSESSMENT — MIFFLIN-ST. JEOR: SCORE: 1585.72

## 2019-09-04 NOTE — LETTER
9/4/2019    Hima Lott MD  [facility] [address] [facility] [address]    RE: Vannesa Alvarez       Dear Colleague,    I had the pleasure of seeing Vannesa Alvarez in the [facility].    HPI and Plan:   Today I had the pleasure of seeing Vannesa Alvarez at [facility] in [facility]. He is a pleasant 88 year old patient with a history of atrial tachyarrhythmias which previously caused an episode of syncope along with dehydration.  Since being placed on beta blockers, he has had no recurrent syncopal episodes.  He returns in annual followup.      The patient has done well over the last 1 year.   He most recently underwent Holter monitor for rhythm monitoring which showed PVC burden of 2% and SVE burden of 1%.  The first SVT was at the rate of 138 bpm and lasted 5 beats.  Overall, this is mostly unchanged from the Holter monitor performed in 2017. He has had no recurrent syncope or palpitations.  On exam, his heart rate is regular.  He denies any chest pain, pressure, shortness of breath, orthopnea or paroxysmal nocturnal dyspnea.     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  He had an echocardiogram in 03/2016 which was normal.  LVEF was 55 to 60% and no wall motion abnormalities or valvular disease were noted.    Assessment and plan  1.  History of atrial tachycardia/SVT  2.  PVCs  3.  Hypertension-well-controlled  4.  Incomplete RBBB and LAFB    The patient is currently doing well from a cardiovascular standpoint.  I will continue him on the current dose of AV loida blockage and have him come back and see me in a year.  I would prefer not to uptitrate metoprolol given incomplete right bundle branch block and left atrial fascicular block.    Thank you for allowing me to participate in the care of Vannesa URENA  Antonio Khan MD  Cardiology    Orders Placed This Encounter   Procedures     Follow-Up with Cardiologist     Encounter Diagnosis   Name Primary?     Atrial tachycardia (H)        CURRENT MEDICATIONS:  Current Outpatient Medications   Medication Sig Dispense Refill     acetaminophen (TYLENOL) 325 MG tablet Take 2 tablets (650 mg) by mouth every 4 hours as needed for mild pain or fever 100 tablet      ALLOPURINOL 300 MG OR TABS 1 TABLET BY MOUTH DAILY       aspirin 81 MG chewable tablet Take 1 tablet (81 mg) by mouth daily 90 tablet 3     cyanocobalamin 1000 MCG/ML injection Inject 1 mL into the muscle every 30 days Around the 8th. Gets at [facility]       indapamide (LOZOL) 2.5 MG tablet Take 5 mg by mouth daily       metoprolol succinate (TOPROL-XL) 25 MG 24 hr tablet Take 1 tablet (25 mg) by mouth daily every morning. 90 tablet 3     nystatin (MYCOSTATIN) cream Apply topically 2 times daily       OMEPRAZOLE PO Take 20 mg by mouth 2 times daily (before meals)       potassium chloride SA (K-DUR/KLOR-CON M) 10 MEQ CR tablet Take 20 mEq by mouth 3 times daily          ALLERGIES     Allergies   Allergen Reactions     Latex Itching     Cipro [Ciprofloxacin] Itching and Rash       PAST MEDICAL HISTORY:  Past Medical History:   Diagnosis Date     Cancer (H) 5/2014    Prostate Cancer, skin cancer     Gastro-oesophageal reflux disease      Syncope        PAST SURGICAL HISTORY:  No past surgical history on file.    FAMILY HISTORY:  No family history on file.    SOCIAL HISTORY:  Social History     Socioeconomic History     Marital status:      Spouse name: None     Number of children: None     Years of education: None     Highest education level: None   Occupational History     None   Social Needs     Financial resource strain: None     Food insecurity:     Worry: None     Inability: None     Transportation needs:     Medical: None     Non-medical: None   Tobacco Use     Smoking status: Former Smoker      Last attempt to quit: 1969     Years since quittin.7     Smokeless tobacco: Never Used   Substance and Sexual Activity     Alcohol use: None     Drug use: None     Sexual activity: None   Lifestyle     Physical activity:     Days per week: None     Minutes per session: None     Stress: None   Relationships     Social connections:     Talks on phone: None     Gets together: None     Attends Judaism service: None     Active member of club or organization: None     Attends meetings of clubs or organizations: None     Relationship status: None     Intimate partner violence:     Fear of current or ex partner: None     Emotionally abused: None     Physically abused: None     Forced sexual activity: None   Other Topics Concern     Parent/sibling w/ CABG, MI or angioplasty before 65F 55M? Not Asked   Social History Narrative     None       Review of Systems:  Skin:  Positive for bruising     Eyes:  Negative      ENT:  Negative      Respiratory:  Positive for cough     Cardiovascular:  Negative      Gastroenterology: Positive for excessive gas or bloating    Genitourinary:  Negative      Musculoskeletal:  Negative      Neurologic:  Positive for numbness or tingling of feet;tremors;involuntary movement    Psychiatric:  Negative      Heme/Lymph/Imm:  Negative      Endocrine:  Negative        Physical Exam:  Vitals: /70 (BP Location: Right arm, Patient Position: Sitting, Cuff Size: Adult Regular)   Pulse 67   Wt 92.7 kg (204 lb 6.4 oz)   SpO2 94%   BMI 29.33 kg/m      Constitutional: awake, alert, no distress  Skin: Warm and dry to touch  Head: Normocephalic, atraumatic  Eyes: Conjunctivae and lids unremarkable, sclera white  ENT: No pallor or cyanosis  Respiratory: Normal breath sounds, clear to auscultation  Cardiac: Regular rate and rhythm, S1-S2 normal.  No murmurs gallops or rubs.   No pedal edema.   Extremities and musculoskeletal: No gross motor deficit  Neurological.  Affect normal  Psych: Alert  and oriented x3    Recent Lab Results:  LIPID RESULTS:  Lab Results   Component Value Date    CHOL 152 09/03/2014    HDL 50 09/03/2014    LDL 76 09/03/2014    TRIG 129 09/03/2014    CHOLHDLRATIO 3.0 09/03/2014       LIVER ENZYME RESULTS:  Lab Results   Component Value Date    AST 28 04/03/2018    ALT 18 04/03/2018       CBC RESULTS:  Lab Results   Component Value Date    WBC 5.4 04/03/2018    RBC 4.42 04/03/2018    HGB 14.4 04/03/2018    HCT 40.7 04/03/2018    MCV 92 04/03/2018    MCH 32.6 04/03/2018    MCHC 35.4 04/03/2018    RDW 13.1 04/03/2018     (L) 04/03/2018       BMP RESULTS:  Lab Results   Component Value Date     04/03/2018    POTASSIUM 3.1 (L) 04/03/2018    CHLORIDE 99 04/03/2018    CO2 32 04/03/2018    ANIONGAP 5 04/03/2018    GLC 94 04/03/2018    BUN 13 04/03/2018    CR 1.11 04/03/2018    GFRESTIMATED 63 04/03/2018    GFRESTBLACK 76 04/03/2018    VANIA 8.4 (L) 04/03/2018        A1C RESULTS:  No results found for: A1C    INR RESULTS:  No results found for: INR        All medical records were reviewed in detail and discussed with the patient. Greater than 30 mins were spent with the patient, 50% of this time was spent on counseling and coordination of care.  After visit summary was printed and given to the patient.        Thank you for allowing me to participate in the care of your patient.    Sincerely,     Shantanu Khan MD     [facility]

## 2019-09-04 NOTE — DISCHARGE INSTRUCTIONS
You may take Tylenol thousand milligrams 3-4 times daily as needed for pain management.  Elevate the foot if it begins to throb.  Tomorrow morning shower and remove the dressing leave open to air for healing.  You may cover with a Band-Aid or ointment as desired but leaving open to air is best.  Wear the postop shoe to protect the toe.  Please wear socks until the toenail has completely started to grow again.  Follow-up if concerns of infection.

## 2019-09-04 NOTE — PROGRESS NOTES
HPI and Plan:   Today I had the pleasure of seeing Vannesa Alvarez at [facility] clinic in [facility]. He is a pleasant 88 year old patient with a history of atrial tachyarrhythmias which previously caused an episode of syncope along with dehydration.  Since being placed on beta blockers, he has had no recurrent syncopal episodes.  He returns in annual followup.      The patient has done well over the last 1 year.  He most recently underwent Holter monitor for rhythm monitoring which showed PVC burden of 2% and SVE burden of 1%.  The first SVT was at the rate of 138 bpm and lasted 5 beats.  Overall, this is mostly unchanged from the Holter monitor performed in 2017. He has had no recurrent syncope or palpitations.  On exam, his heart rate is regular.  He denies any chest pain, pressure, shortness of breath, orthopnea or paroxysmal nocturnal dyspnea.     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  He had an echocardiogram in 03/2016 which was normal.  LVEF was 55 to 60% and no wall motion abnormalities or valvular disease were noted.    Assessment and plan  1.  History of atrial tachycardia/SVT  2.  PVCs  3.  Hypertension-well-controlled  4.  Incomplete RBBB and LAFB    The patient is currently doing well from a cardiovascular standpoint.  I will continue him on the current dose of AV loida blockage and have him come back and see me in a year.  I would prefer not to uptitrate metoprolol given incomplete right bundle branch block and left atrial fascicular block.    Thank you for allowing me to participate in the care of Vannesa Alvarez    Shantanu Khan MD  Cardiology    Orders Placed This Encounter   Procedures     Follow-Up with Cardiologist     Encounter Diagnosis   Name Primary?     Atrial tachycardia (H)        CURRENT MEDICATIONS:  Current Outpatient Medications   Medication Sig  Dispense Refill     acetaminophen (TYLENOL) 325 MG tablet Take 2 tablets (650 mg) by mouth every 4 hours as needed for mild pain or fever 100 tablet      ALLOPURINOL 300 MG OR TABS 1 TABLET BY MOUTH DAILY       aspirin 81 MG chewable tablet Take 1 tablet (81 mg) by mouth daily 90 tablet 3     cyanocobalamin 1000 MCG/ML injection Inject 1 mL into the muscle every 30 days Around the 8th. Gets at [facility]       indapamide (LOZOL) 2.5 MG tablet Take 5 mg by mouth daily       metoprolol succinate (TOPROL-XL) 25 MG 24 hr tablet Take 1 tablet (25 mg) by mouth daily every morning. 90 tablet 3     nystatin (MYCOSTATIN) cream Apply topically 2 times daily       OMEPRAZOLE PO Take 20 mg by mouth 2 times daily (before meals)       potassium chloride SA (K-DUR/KLOR-CON M) 10 MEQ CR tablet Take 20 mEq by mouth 3 times daily          ALLERGIES     Allergies   Allergen Reactions     Latex Itching     Cipro [Ciprofloxacin] Itching and Rash       PAST MEDICAL HISTORY:  Past Medical History:   Diagnosis Date     Cancer (H) 2014    Prostate Cancer, skin cancer     Gastro-oesophageal reflux disease      Syncope        PAST SURGICAL HISTORY:  No past surgical history on file.    FAMILY HISTORY:  No family history on file.    SOCIAL HISTORY:  Social History     Socioeconomic History     Marital status:      Spouse name: None     Number of children: None     Years of education: None     Highest education level: None   Occupational History     None   Social Needs     Financial resource strain: None     Food insecurity:     Worry: None     Inability: None     Transportation needs:     Medical: None     Non-medical: None   Tobacco Use     Smoking status: Former Smoker     Last attempt to quit: 1969     Years since quittin.7     Smokeless tobacco: Never Used   Substance and Sexual Activity     Alcohol use: None     Drug use: None     Sexual activity: None   Lifestyle     Physical activity:     Days per week: None      Minutes per session: None     Stress: None   Relationships     Social connections:     Talks on phone: None     Gets together: None     Attends Pentecostalism service: None     Active member of club or organization: None     Attends meetings of clubs or organizations: None     Relationship status: None     Intimate partner violence:     Fear of current or ex partner: None     Emotionally abused: None     Physically abused: None     Forced sexual activity: None   Other Topics Concern     Parent/sibling w/ CABG, MI or angioplasty before 65F 55M? Not Asked   Social History Narrative     None       Review of Systems:  Skin:  Positive for bruising     Eyes:  Negative      ENT:  Negative      Respiratory:  Positive for cough     Cardiovascular:  Negative      Gastroenterology: Positive for excessive gas or bloating    Genitourinary:  Negative      Musculoskeletal:  Negative      Neurologic:  Positive for numbness or tingling of feet;tremors;involuntary movement    Psychiatric:  Negative      Heme/Lymph/Imm:  Negative      Endocrine:  Negative        Physical Exam:  Vitals: /70 (BP Location: Right arm, Patient Position: Sitting, Cuff Size: Adult Regular)   Pulse 67   Wt 92.7 kg (204 lb 6.4 oz)   SpO2 94%   BMI 29.33 kg/m     Constitutional: awake, alert, no distress  Skin: Warm and dry to touch  Head: Normocephalic, atraumatic  Eyes: Conjunctivae and lids unremarkable, sclera white  ENT: No pallor or cyanosis  Respiratory: Normal breath sounds, clear to auscultation  Cardiac: Regular rate and rhythm, S1-S2 normal.  No murmurs gallops or rubs.   No pedal edema.   Extremities and musculoskeletal: No gross motor deficit  Neurological.  Affect normal  Psych: Alert and oriented x3    Recent Lab Results:  LIPID RESULTS:  Lab Results   Component Value Date    CHOL 152 09/03/2014    HDL 50 09/03/2014    LDL 76 09/03/2014    TRIG 129 09/03/2014    CHOLHDLRATIO 3.0 09/03/2014       LIVER ENZYME RESULTS:  Lab Results   Component  Value Date    AST 28 04/03/2018    ALT 18 04/03/2018       CBC RESULTS:  Lab Results   Component Value Date    WBC 5.4 04/03/2018    RBC 4.42 04/03/2018    HGB 14.4 04/03/2018    HCT 40.7 04/03/2018    MCV 92 04/03/2018    MCH 32.6 04/03/2018    MCHC 35.4 04/03/2018    RDW 13.1 04/03/2018     (L) 04/03/2018       BMP RESULTS:  Lab Results   Component Value Date     04/03/2018    POTASSIUM 3.1 (L) 04/03/2018    CHLORIDE 99 04/03/2018    CO2 32 04/03/2018    ANIONGAP 5 04/03/2018    GLC 94 04/03/2018    BUN 13 04/03/2018    CR 1.11 04/03/2018    GFRESTIMATED 63 04/03/2018    GFRESTBLACK 76 04/03/2018    VANIA 8.4 (L) 04/03/2018        A1C RESULTS:  No results found for: A1C    INR RESULTS:  No results found for: INR    CC  Bill Tsai MD  [address] [address]    All medical records were reviewed in detail and discussed with the patient. Greater than 30 mins were spent with the patient, 50% of this time was spent on counseling and coordination of care.  After visit summary was printed and given to the patient.

## 2019-09-04 NOTE — ED AVS SNAPSHOT
[facility] Emergency Department  [address] [address]  Phone:  [phone]  Fax:  [phone]                                    Vannesa Alvarez   MRN: [id number]    Department:  [facility] Emergency Department   Date of Visit:  9/4/2019           After Visit Summary Signature Page    I have received my discharge instructions, and my questions have been answered. I have discussed any challenges I see with this plan with the nurse or doctor.    ..........................................................................................................................................  Patient/Patient Representative Signature      ..........................................................................................................................................  Patient Representative Print Name and Relationship to Patient    ..................................................               ................................................  Date                                   Time    ..........................................................................................................................................  Reviewed by Signature/Title    ...................................................              ..............................................  Date                                               Time          22EPIC Rev 08/18

## 2019-09-04 NOTE — LETTER
9/4/2019    Hima Lott MD  [facility] [address] [address]    RE: Vannesa Alvarez       Dear Colleague,    I had the pleasure of seeing Vannesa Alvarez in the [facility].    HPI and Plan:   Today I had the pleasure of seeing Vannesa Alvarez at [facility] clinic in [facility]. He is a pleasant 88 year old patient with a history of atrial tachyarrhythmias which previously caused an episode of syncope along with dehydration.  Since being placed on beta blockers, he has had no recurrent syncopal episodes.  He returns in annual followup.      The patient has done well over the last 1 year.   He most recently underwent Holter monitor for rhythm monitoring which showed PVC burden of 2% and SVE burden of 1%.  The first SVT was at the rate of 138 bpm and lasted 5 beats.  Overall, this is mostly unchanged from the Holter monitor performed in 2017. He has had no recurrent syncope or palpitations.  On exam, his heart rate is regular.  He denies any chest pain, pressure, shortness of breath, orthopnea or paroxysmal nocturnal dyspnea.     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  He had an echocardiogram in 03/2016 which was normal.  LVEF was 55 to 60% and no wall motion abnormalities or valvular disease were noted.    Assessment and plan  1.  History of atrial tachycardia/SVT  2.  PVCs  3.  Hypertension-well-controlled  4.  Incomplete RBBB and LAFB    The patient is currently doing well from a cardiovascular standpoint.  I will continue him on the current dose of AV loida blockage and have him come back and see me in a year.  I would prefer not to uptitrate metoprolol given incomplete right bundle branch block and left atrial fascicular block.    Thank you for allowing me to participate in the care of Vannesa URENA  Antonio Khan MD  Cardiology    Orders Placed This Encounter   Procedures     Follow-Up with Cardiologist     Encounter Diagnosis   Name Primary?     Atrial tachycardia (H)        CURRENT MEDICATIONS:  Current Outpatient Medications   Medication Sig Dispense Refill     acetaminophen (TYLENOL) 325 MG tablet Take 2 tablets (650 mg) by mouth every 4 hours as needed for mild pain or fever 100 tablet      ALLOPURINOL 300 MG OR TABS 1 TABLET BY MOUTH DAILY       aspirin 81 MG chewable tablet Take 1 tablet (81 mg) by mouth daily 90 tablet 3     cyanocobalamin 1000 MCG/ML injection Inject 1 mL into the muscle every 30 days Around the 8th. Gets at [facility]       indapamide (LOZOL) 2.5 MG tablet Take 5 mg by mouth daily       metoprolol succinate (TOPROL-XL) 25 MG 24 hr tablet Take 1 tablet (25 mg) by mouth daily every morning. 90 tablet 3     nystatin (MYCOSTATIN) cream Apply topically 2 times daily       OMEPRAZOLE PO Take 20 mg by mouth 2 times daily (before meals)       potassium chloride SA (K-DUR/KLOR-CON M) 10 MEQ CR tablet Take 20 mEq by mouth 3 times daily          ALLERGIES     Allergies   Allergen Reactions     Latex Itching     Cipro [Ciprofloxacin] Itching and Rash       PAST MEDICAL HISTORY:  Past Medical History:   Diagnosis Date     Cancer (H) 5/2014    Prostate Cancer, skin cancer     Gastro-oesophageal reflux disease      Syncope        PAST SURGICAL HISTORY:  No past surgical history on file.    FAMILY HISTORY:  No family history on file.    SOCIAL HISTORY:  Social History     Socioeconomic History     Marital status:      Spouse name: None     Number of children: None     Years of education: None     Highest education level: None   Occupational History     None   Social Needs     Financial resource strain: None     Food insecurity:     Worry: None     Inability: None     Transportation needs:     Medical: None     Non-medical: None   Tobacco Use     Smoking status: Former Smoker      Last attempt to quit: 1969     Years since quittin.7     Smokeless tobacco: Never Used   Substance and Sexual Activity     Alcohol use: None     Drug use: None     Sexual activity: None   Lifestyle     Physical activity:     Days per week: None     Minutes per session: None     Stress: None   Relationships     Social connections:     Talks on phone: None     Gets together: None     Attends Alevism service: None     Active member of club or organization: None     Attends meetings of clubs or organizations: None     Relationship status: None     Intimate partner violence:     Fear of current or ex partner: None     Emotionally abused: None     Physically abused: None     Forced sexual activity: None   Other Topics Concern     Parent/sibling w/ CABG, MI or angioplasty before 65F 55M? Not Asked   Social History Narrative     None       Review of Systems:  Skin:  Positive for bruising     Eyes:  Negative      ENT:  Negative      Respiratory:  Positive for cough     Cardiovascular:  Negative      Gastroenterology: Positive for excessive gas or bloating    Genitourinary:  Negative      Musculoskeletal:  Negative      Neurologic:  Positive for numbness or tingling of feet;tremors;involuntary movement    Psychiatric:  Negative      Heme/Lymph/Imm:  Negative      Endocrine:  Negative        Physical Exam:  Vitals: /70 (BP Location: Right arm, Patient Position: Sitting, Cuff Size: Adult Regular)   Pulse 67   Wt 92.7 kg (204 lb 6.4 oz)   SpO2 94%   BMI 29.33 kg/m      Constitutional: awake, alert, no distress  Skin: Warm and dry to touch  Head: Normocephalic, atraumatic  Eyes: Conjunctivae and lids unremarkable, sclera white  ENT: No pallor or cyanosis  Respiratory: Normal breath sounds, clear to auscultation  Cardiac: Regular rate and rhythm, S1-S2 normal.  No murmurs gallops or rubs.   No pedal edema.   Extremities and musculoskeletal: No gross motor deficit  Neurological.  Affect normal  Psych: Alert  and oriented x3    Recent Lab Results:  LIPID RESULTS:  Lab Results   Component Value Date    CHOL 152 09/03/2014    HDL 50 09/03/2014    LDL 76 09/03/2014    TRIG 129 09/03/2014    CHOLHDLRATIO 3.0 09/03/2014       LIVER ENZYME RESULTS:  Lab Results   Component Value Date    AST 28 04/03/2018    ALT 18 04/03/2018       CBC RESULTS:  Lab Results   Component Value Date    WBC 5.4 04/03/2018    RBC 4.42 04/03/2018    HGB 14.4 04/03/2018    HCT 40.7 04/03/2018    MCV 92 04/03/2018    MCH 32.6 04/03/2018    MCHC 35.4 04/03/2018    RDW 13.1 04/03/2018     (L) 04/03/2018       BMP RESULTS:  Lab Results   Component Value Date     04/03/2018    POTASSIUM 3.1 (L) 04/03/2018    CHLORIDE 99 04/03/2018    CO2 32 04/03/2018    ANIONGAP 5 04/03/2018    GLC 94 04/03/2018    BUN 13 04/03/2018    CR 1.11 04/03/2018    GFRESTIMATED 63 04/03/2018    GFRESTBLACK 76 04/03/2018    VANIA 8.4 (L) 04/03/2018        A1C RESULTS:  No results found for: A1C    INR RESULTS:  No results found for: INR    CC  [place] [address]    All medical records were reviewed in detail and discussed with the patient. Greater than 30 mins were spent with the patient, 50% of this time was spent on counseling and coordination of care.  After visit summary was printed and given to the patient.    Thank you for allowing me to participate in the care of your patient.      Sincerely,     Shantanu Khan MD     [facility]    cc:   [place] [address]

## 2019-09-04 NOTE — ED PROVIDER NOTES
History     Chief Complaint   Patient presents with     Toenail     left great toenail partially off-bleeding slightly     HPI  Vannesa Alvarez is a 88 year old male who presents to the emergency department with a left great toenail injury.  Patient reports that he was at home and had showered this morning and noted that his left great toenail looked different.  Patient states that he previously had had a large bruise under the toenail for quite some time.  Patient states he was telling the foot off and the toenail caught on the towel and lifted up causing severe pain.  Patient reports he feels well otherwise and denies any other concerns.    Allergies:  Allergies   Allergen Reactions     Latex Itching     Cipro [Ciprofloxacin] Itching and Rash       Problem List:    Patient Active Problem List    Diagnosis Date Noted     Pneumonia 2014     Priority: Medium     Hypoxia 2014     Priority: Medium     Syncope 2014     Priority: Medium        Past Medical History:    Past Medical History:   Diagnosis Date     Cancer (H) 2014     Gastro-oesophageal reflux disease      Syncope        Past Surgical History:    No past surgical history on file.    Family History:    No family history on file.    Social History:  Marital Status:   [2]  Social History     Tobacco Use     Smoking status: Former Smoker     Last attempt to quit: 1969     Years since quittin.7     Smokeless tobacco: Never Used   Substance Use Topics     Alcohol use: Not on file     Drug use: Not on file        Medications:      acetaminophen (TYLENOL) 325 MG tablet   ALLOPURINOL 300 MG OR TABS   aspirin 81 MG chewable tablet   cyanocobalamin 1000 MCG/ML injection   indapamide (LOZOL) 2.5 MG tablet   metoprolol succinate (TOPROL-XL) 25 MG 24 hr tablet   nystatin (MYCOSTATIN) cream   OMEPRAZOLE PO   potassium chloride SA (K-DUR/KLOR-CON M) 10 MEQ CR tablet     Review of Systems  As mentioned above in the history present  "illness. All other systems were reviewed and are negative.    Physical Exam   BP: (!) 146/82  Heart Rate: 75  Temp: 97.3  F (36.3  C)  Height: 175.3 cm (5' 9\")  Weight: 92.5 kg (204 lb)  SpO2: 97 %      Physical Exam   Constitutional: He appears well-developed and well-nourished. He is cooperative. No distress.   HENT:   Head: Normocephalic and atraumatic.   Right Ear: Hearing and external ear normal.   Left Ear: Hearing and external ear normal.   Nose: Nose normal.   Mouth/Throat: Oropharynx is clear and moist.   Eyes: Conjunctivae are normal. Right eye exhibits no discharge. Left eye exhibits no discharge.   Cardiovascular: Normal rate, regular rhythm, normal heart sounds and intact distal pulses. Exam reveals no gallop and no friction rub.   No murmur heard.  Pulmonary/Chest: Effort normal and breath sounds normal. No stridor. No respiratory distress. He has no wheezes. He has no rales.   Musculoskeletal: He exhibits no edema.        Left foot: There is tenderness (left great toenail is 99% avulsed with hemostasis controlled, normal ROM and sensation of left great toe). There is normal range of motion, no bony tenderness, no swelling, normal capillary refill, no crepitus, no deformity and no laceration.   Neurological: He is alert.   Skin: Skin is warm. Capillary refill takes less than 2 seconds. No rash noted. He is not diaphoretic.   Psychiatric: He has a normal mood and affect.   Nursing note and vitals reviewed.      ED Course        Procedures    Toenail Removal with digital block:  Procedure and verbal consent obtained from patient  Location: left great toe   Anesthesia: Digital block with 4 ml of Bupivicaine 0.25%  Procedure: After adequate local anesthesia, the edge of nail bed was lifted and removed using a nail plyers without difficulty.  Bleeding controlled.  Bandage with bacitracin and gauze applied.    No results found for this or any previous visit (from the past 24 hour(s)).    Medications   silver " nitrate (ARZOL) Misc (has no administration in time range)   acetaminophen (TYLENOL) tablet 975 mg (975 mg Oral Given 9/4/19 1054)       Assessments & Plan (with Medical Decision Making)     I have reviewed the nursing notes.    I have reviewed the findings, diagnosis, plan and need for follow up with the patient.  Patient presents to urgent care with a 99% avulsed nail of the left great toe.  Patient reports feeling well otherwise and denies any other concerns.  On exam the left great toenail is 99% avulsed with hemostasis controlled and normal range of motion and sensation.  Discussion had with patient versus leaving versus removal and patient requests removal.  Nail removal was completed including a digital block without any complications.  Patient tolerated the procedure well.  Toe was then dressed with bacitracin and tube gauze.  Discharge instructions provided.  Postop shoe provided.  You may take Tylenol thousand milligrams 3-4 times daily as needed for pain management.  Elevate the foot if it begins to throb.  Tomorrow morning shower and remove the dressing leave open to air for healing.  You may cover with a Band-Aid or ointment as desired but leaving open to air is best.  Wear the postop shoe to protect the toe.  Please wear socks until the toenail has completely started to grow again.  Follow-up if concerns of infection.  Patient discharged in stable condition.  New Prescriptions    No medications on file       Final diagnoses:   Avulsion of toenail of left foot       9/4/2019   [facility] EMERGENCY DEPARTMENT     Phoebe Ontiveros, ABE CNP  09/04/19 3682

## 2019-11-01 NOTE — ED PROVIDER NOTES
History     Chief Complaint   Patient presents with     Fall     fell after feeling weak, no injury reported.      HPI  Vannesa Alvarez is a 87 year old male with past medical history which includes GERD and previous syncopal episode who presents for evaluation of cough with generalized weakness and fall to the ground this morning.  Patient explains that he has been coughing for the past couple of days but overnight his cough increased and he felt as though it was associated with a fever.  He denies chest pain or shortness of breath.  He walked outside today to get the newspaper and upon returning inside he fell to the ground.  The fall was witnessed by his significant other, they both describe generalized weakness causing the patient to ease himself to the ground in a seated position.  There was no loss of consciousness and patient denies headache or head injury.  He has no active complaint.    Problem List:    Patient Active Problem List    Diagnosis Date Noted     Pneumonia 11/02/2014     Priority: Medium     Hypoxia 11/02/2014     Priority: Medium     Syncope 07/13/2014     Priority: Medium        Past Medical History:    Past Medical History:   Diagnosis Date     Cancer (H) 5/2014     Gastro-oesophageal reflux disease      Syncope        Past Surgical History:    No past surgical history on file.    Family History:    No family history on file.    Social History:  Marital Status:   [2]  Social History   Substance Use Topics     Smoking status: Former Smoker     Quit date: 1/1/1969     Smokeless tobacco: Not on file     Alcohol use Not on file        Medications:      OMEPRAZOLE PO   potassium chloride SA (K-DUR/KLOR-CON M) 10 MEQ CR tablet   indapamide (LOZOL) 2.5 MG tablet   azithromycin (ZITHROMAX) 250 MG tablet   metoprolol (TOPROL-XL) 25 MG 24 hr tablet   acetaminophen (TYLENOL) 325 MG tablet   aspirin 81 MG chewable tablet   cyanocobalamin 1000 MCG/ML injection   ALLOPURINOL 300 MG OR TABS    nystatin (MYCOSTATIN) cream         Review of Systems  Constitutional: Positive for generalized weakness and feeling febrile last night.  HENT:  Negative oral or throat pain.  Eye:  Negative for visual changes from baseline.  Cardiovascular:  Negative for chest pain.  Respiratory: Positive for dry cough.  Negative for shortness of breath.  Gastrointestinal:  Negative for abdominal pain, nausea, vomiting, or diarrhea.   Genitourinary:  Negative for dysuria.  Musculoskeletal:  Negative for neck pain, back pain, or recent injuries.  Neurological: Negative for headache, sensory deficits, or focal weakness.    All others reviewed and are negative.      Physical Exam   BP: 127/67  Pulse: 98  Heart Rate: 93  Temp: 98.5  F (36.9  C)  Resp: 18  SpO2: 94 %      Physical Exam  Constitutional:  Well developed, well nourished.  Appears nontoxic and in no acute distress.  Resting comfortably on the gurney.  HENT:  Normocephalic and atraumatic.  Symmetric in appearance.  Eyes:  Conjunctivae are normal.  Neck:  Neck supple.  Cardiovascular:  No cyanosis.  RRR.  No audible murmurs noted.  No lower extremity edema or asymmetry.  Respiratory:  Effort normal, no respiratory distress.  CTAB without diminished regions.  No wheezing, rhonchi, or crackles.   Gastrointestinal:  Soft, nondistended abdomen.  Nontender and without guarding.  No rigidity or rebound tenderness.  Negative Cruz's sign.  Negative McBurney's point.  No palpable pulsatile mass.  Genitourinary:  Noncontributory.  Musculoskeletal:  Moves extremities spontaneously.  Neurological:  Patient is alert.  Skin:  Skin is warm and dry.  Psychiatric:  Normal mood and affect.      ED Course     ED Course     Procedures                 EKG Interpretation:      Interpreted by: Lamont Medrano  Time reviewed: Upon arrival     Symptoms at time of EKG: Asymptomatic  Rhythm: Sinus  Rate: Normal  Axis: Left  Conduction: None atypical   ST Segments/ T Waves: No pathologic ST-elevations  or T-wave abnormalities.  Q Waves: None  Comparison to prior: Similar morphology to previous     Clinical Impression: No sign of ischemia         Critical Care time:  none               Results for orders placed or performed during the hospital encounter of 04/03/18 (from the past 24 hour(s))   CBC with platelets differential   Result Value Ref Range    WBC 5.4 4.0 - 11.0 10e9/L    RBC Count 4.42 4.4 - 5.9 10e12/L    Hemoglobin 14.4 13.3 - 17.7 g/dL    Hematocrit 40.7 40.0 - 53.0 %    MCV 92 78 - 100 fl    MCH 32.6 26.5 - 33.0 pg    MCHC 35.4 31.5 - 36.5 g/dL    RDW 13.1 10.0 - 15.0 %    Platelet Count 117 (L) 150 - 450 10e9/L    Diff Method Automated Method     % Neutrophils 70.2 %    % Lymphocytes 12.5 %    % Monocytes 15.4 %    % Eosinophils 1.1 %    % Basophils 0.2 %    % Immature Granulocytes 0.6 %    Absolute Neutrophil 3.8 1.6 - 8.3 10e9/L    Absolute Lymphocytes 0.7 (L) 0.8 - 5.3 10e9/L    Absolute Monocytes 0.8 0.0 - 1.3 10e9/L    Absolute Eosinophils 0.1 0.0 - 0.7 10e9/L    Absolute Basophils 0.0 0.0 - 0.2 10e9/L    Abs Immature Granulocytes 0.0 0 - 0.4 10e9/L   Comprehensive metabolic panel   Result Value Ref Range    Sodium 136 133 - 144 mmol/L    Potassium 3.1 (L) 3.4 - 5.3 mmol/L    Chloride 99 94 - 109 mmol/L    Carbon Dioxide 32 20 - 32 mmol/L    Anion Gap 5 3 - 14 mmol/L    Glucose 94 70 - 99 mg/dL    Urea Nitrogen 13 7 - 30 mg/dL    Creatinine 1.11 0.66 - 1.25 mg/dL    GFR Estimate 63 >60 mL/min/1.7m2    GFR Estimate If Black 76 >60 mL/min/1.7m2    Calcium 8.4 (L) 8.5 - 10.1 mg/dL    Bilirubin Total 0.7 0.2 - 1.3 mg/dL    Albumin 3.4 3.4 - 5.0 g/dL    Protein Total 7.5 6.8 - 8.8 g/dL    Alkaline Phosphatase 85 40 - 150 U/L    ALT 18 0 - 70 U/L    AST 28 0 - 45 U/L   Troponin I   Result Value Ref Range    Troponin I ES <0.015 0.000 - 0.045 ug/L   XR Chest 2 Views    Narrative    XR CHEST 2 VW 4/3/2018 1:10 PM    HISTORY: Cough. Fever.  Fall.    COMPARISON: 11/2/2014.      Impression    IMPRESSION: 2  views of the chest show no acute cardiopulmonary disease  and no significant change.     WATSON HERRERA MD   UA reflex to Microscopic and Culture   Result Value Ref Range    Color Urine Yellow     Appearance Urine Clear     Glucose Urine Negative NEG^Negative mg/dL    Bilirubin Urine Negative NEG^Negative    Ketones Urine Negative NEG^Negative mg/dL    Specific Gravity Urine 1.015 1.003 - 1.035    Blood Urine Negative NEG^Negative    pH Urine 6.0 5.0 - 7.0 pH    Protein Albumin Urine Negative NEG^Negative mg/dL    Urobilinogen mg/dL 4.0 (H) 0.0 - 2.0 mg/dL    Nitrite Urine Negative NEG^Negative    Leukocyte Esterase Urine Small (A) NEG^Negative    Source Midstream Urine     RBC Urine 2 0 - 2 /HPF    WBC Urine 3 0 - 5 /HPF       Medications   potassium chloride (KLOR-CON) Packet 40 mEq (not administered)   lactated ringers BOLUS 500 mL (500 mLs Intravenous New Bag 4/3/18 1243)       Assessments & Plan (with Medical Decision Making)   Vannesa Alvarez is a 87 year old male who presents to the department for evaluation after episode of fall which he attributes to generalized weakness.  He reports that he has had a productive cough over the past 2 days but without chest pain, shortness of breath, or wheezing.  Comorbid these include history of hypertension and previous syncopal episodes, but he denies loss of consciousness during witnessed fall today.  He is also without pain or injurious complaint.  Differential diagnosis included pneumonia, pneumothorax, acute coronary syndrome, and near syncopal episode.  His CBC is without leukocytosis or anemia.  No sign of aortic stenosis, AAA, ICH, or pulmonary embolism.  EKG is without evidence of WPW, Brugada, long-QT syndrome, hypertrophic cardiomyopathy, or significant heart block.  Chest x-ray ordered, independently reviewed, and without sign of infiltrate or other acute pathology.  The patient has remained comfortable, and other than cough has been without symptoms.  He  ambulated well within the department suffering from no lightheadedness or weakness.  Had a long discussion with patient in the presence of his family regarding admission for close monitoring of symptoms, ensuring safety, and treatment for clinical bronchitis versus early pneumonia.  Patient is alert and oriented, appears to have decision making capacity, and has verbalized an understanding of the risks of going home but maintains that he feels safe.  Prior to discharge we discussed the potential for developing illness and insructions for return to the emergency department.  He and family agree to schedule follow-up with primary care provider over the next 1-2 days for reevaluation.      Disclaimer:  This note consists of symbols derived from keyboarding, dictation, and/or voice recognition software.  As a result, there may be errors in the script that have gone undetected.  Please consider this when interpreting information found in the chart.        I have reviewed the nursing notes.    I have reviewed the findings, diagnosis, plan and need for follow up with the patient.       New Prescriptions    AZITHROMYCIN (ZITHROMAX) 250 MG TABLET    Two tablets first day, then one tablet daily for four days.       Final diagnoses:   Acute bronchitis, unspecified organism   Episode of generalized weakness   Fall, initial encounter       4/3/2018   [facility] EMERGENCY DEPARTMENT     Lamont Medrano,   04/03/18 1426     n/a

## 2020-11-04 ENCOUNTER — VIRTUAL VISIT (OUTPATIENT)
Dept: CARDIOLOGY | Facility: CLINIC | Age: 85
End: 2020-11-04
Payer: MEDICARE

## 2020-11-04 VITALS
BODY MASS INDEX: 29.83 KG/M2 | SYSTOLIC BLOOD PRESSURE: 138 MMHG | DIASTOLIC BLOOD PRESSURE: 88 MMHG | HEART RATE: 74 BPM | WEIGHT: 202 LBS

## 2020-11-04 DIAGNOSIS — I47.19 ATRIAL TACHYCARDIA (H): ICD-10-CM

## 2020-11-04 PROCEDURE — 99442 PR PHYSICIAN TELEPHONE EVALUATION 11-20 MIN: CPT | Performed by: INTERNAL MEDICINE

## 2020-11-04 NOTE — PROGRESS NOTES
"Vannesa Alvarez is a 89 year old male who is being evaluated via a billable telephone visit.      The patient has been notified of following:     \"This telephone visit will be conducted via a call between you and your physician/provider. We have found that certain health care needs can be provided without the need for a physical exam.  This service lets us provide the care you need with a short phone conversation.  If a prescription is necessary we can send it directly to your pharmacy.  If lab work is needed we can place an order for that and you can then stop by our lab to have the test done at a later time.    Telephone visits are billed at different rates depending on your insurance coverage. During this emergency period, for some insurers they may be billed the same as an in-person visit.  Please reach out to your insurance provider with any questions.    If during the course of the call the physician/provider feels a telephone visit is not appropriate, you will not be charged for this service.\"    Patient has given verbal consent for Telephone visit?  Yes    What phone number would you like to be contacted at? [phone]    How would you like to obtain your AVS? Mail a copy    Sophie Franks MA 11/4/2020 8:16 AM    This clinic visit was performed via telephone/video due to COVID-19 restrictions.    Today, I had the pleasure of connecting with Vannesa Alvarez for a follow-up visit.  He is a pleasant 89 year old patient with a   history of atrial tachyarrhythmias which previously caused an episode of syncope along with dehydration.  Since being placed on beta blockers, he has had no recurrent syncopal episodes.  He returns in annual followup.      The patient has done well over the last 1 year.  He most recently underwent Holter monitor for rhythm monitoring which showed PVC burden of 2% and SVE burden of 1%.  The first SVT was at the rate of 138 bpm and lasted 5 beats.  Overall, this is mostly " unchanged from the Holter monitor performed in 2017. He has had no recurrent syncope or palpitations.   He denies any chest pain, pressure, shortness of breath, orthopnea or paroxysmal nocturnal dyspnea.      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  He had an echocardiogram in 03/2016 which was normal.  LVEF was 55 to 60% and no wall motion abnormalities or valvular disease were noted. He uses stationary bike on a daily basis and is able to do so without any problems.      Assessment and plan:   1.  History of atrial tachycardia/SVT  2.  PVCs  3.  Hypertension-well-controlled  4.  Incomplete RBBB and LAFB     The patient is currently doing well from a cardiovascular standpoint.  I will continue him on the current dose of AV loida blockage and have him come back and see me in a year.  I would prefer not to uptitrate metoprolol given incomplete right bundle branch block and left atrial fascicular block.    Thank you for allowing me to participate in the care of Vannesa Alvarez    This note was completed in part using Dragon voice recognition software. Although reviewed after completion, some word and grammatical errors may occur.    Shantanu Khan MD  Cardiology    Orders Placed This Encounter   Procedures     Follow-Up with Cardiologist       No orders of the defined types were placed in this encounter.      There are no discontinued medications.    Encounter Diagnosis   Name Primary?     Atrial tachycardia (H)        CURRENT MEDICATIONS:  Current Outpatient Medications   Medication Sig Dispense Refill     acetaminophen (TYLENOL) 325 MG tablet Take 2 tablets (650 mg) by mouth every 4 hours as needed for mild pain or fever 100 tablet      ALLOPURINOL 300 MG OR TABS 1 TABLET BY MOUTH DAILY       aspirin 81 MG chewable tablet Take 1 tablet (81 mg) by mouth daily 90 tablet 3     cyanocobalamin 1000 MCG/ML injection Inject 1 mL  into the muscle every 30 days Around the 8[facility]. Gets at [facility]       indapamide (LOZOL) 2.5 MG tablet Take 5 mg by mouth daily       metoprolol succinate (TOPROL-XL) 25 MG 24 hr tablet Take 1 tablet (25 mg) by mouth daily every morning. 90 tablet 3     nystatin (MYCOSTATIN) cream Apply topically 2 times daily       OMEPRAZOLE PO Take 20 mg by mouth 2 times daily (before meals)       potassium chloride SA (K-DUR/KLOR-CON M) 10 MEQ CR tablet Take 20 mEq by mouth 3 times daily          ALLERGIES     Allergies   Allergen Reactions     Latex Itching     Cipro [Ciprofloxacin] Itching and Rash       PAST MEDICAL HISTORY:  Past Medical History:   Diagnosis Date     Cancer (H) 2014    Prostate Cancer, skin cancer     Gastro-oesophageal reflux disease      Syncope        PAST SURGICAL HISTORY:  No past surgical history on file.    FAMILY HISTORY:  No family history on file.    SOCIAL HISTORY:  Social History     Socioeconomic History     Marital status:      Spouse name: None     Number of children: None     Years of education: None     Highest education level: None   Occupational History     None   Social Needs     Financial resource strain: None     Food insecurity     Worry: None     Inability: None     Transportation needs     Medical: None     Non-medical: None   Tobacco Use     Smoking status: Former Smoker     Quit date: 1969     Years since quittin.8     Smokeless tobacco: Never Used   Substance and Sexual Activity     Alcohol use: None     Drug use: None     Sexual activity: None   Lifestyle     Physical activity     Days per week: None     Minutes per session: None     Stress: None   Relationships     Social connections     Talks on phone: None     Gets together: None     Attends Spiritism service: None     Active member of club or organization: None     Attends meetings of clubs or organizations: None     Relationship status: None     Intimate partner violence     Fear of current or ex  partner: None     Emotionally abused: None     Physically abused: None     Forced sexual activity: None   Other Topics Concern     Parent/sibling w/ CABG, MI or angioplasty before 65F 55M? Not Asked   Social History Narrative     None       General Appearance: No distress, normal body habitus, upright.  ENT/Mouth:  Normal head shape, no evidence of injury or laceration.  EYES: No scleral icterus, normal conjunctivae  Neck:  No evidence of thyromegaly  Chest/Lungs: No audible wheezing. Non labored breathing. No cough.  Cardiovascular: No evidence of elevated jugular venous pressure.   Abdomen:  No observed jaundice.  Extremities: No cyanosis.  Skin: No xanthelasma, normal skin collar. No evidence of facial lacerations.  Neurologic: No focal neurological defect. Normal speech.  Psychiatric: Alert and oriented x3    Phone call duration: 10 minutes    Shantanu Khan MD

## 2020-11-04 NOTE — LETTER
"11/4/2020    Hima Lott MD  [facility] [address] [address]    RE: Vannesa Alvarez       Dear Colleague,    I had the pleasure of seeing Vannesa Alvarez in the [facility].    Vannesa Alvarez is a 89 year old male who is being evaluated via a billable telephone visit.      The patient has been notified of following:     \"This telephone visit will be conducted via a call between you and your physician/provider. We have found that certain health care needs can be provided without the need for a physical exam.  This service lets us provide the care you need with a short phone conversation.  If a prescription is necessary we can send it directly to your pharmacy.  If lab work is needed we can place an order for that and you can then stop by our lab to have the test done at a later time.    Telephone visits are billed at different rates depending on your insurance coverage. During this emergency period, for some insurers they may be billed the same as an in-person visit.  Please reach out to your insurance provider with any questions.    If during the course of the call the physician/provider feels a telephone visit is not appropriate, you will not be charged for this service.\"    Patient has given verbal consent for Telephone visit?  Yes    What phone number would you like to be contacted at? [phone]    How would you like to obtain your AVS? Mail a copy    Sophie Franks MA 11/4/2020 8:16 AM    This clinic visit was performed via telephone/video due to COVID-19 restrictions.    Today, I had the pleasure of connecting with Vannesa Alvarez for a follow-up visit.  He is a pleasant 89 year old patient with a   history of atrial tachyarrhythmias which previously caused an episode of syncope along with dehydration.  Since being placed on beta blockers, he has had no recurrent syncopal episodes.  He returns in annual followup.      The " patient has done well over the last 1 year.  He most recently underwent Holter monitor for rhythm monitoring which showed PVC burden of 2% and SVE burden of 1%.  The first SVT was at the rate of 138 bpm and lasted 5 beats.  Overall, this is mostly unchanged from the Holter monitor performed in 2017. He has had no recurrent syncope or palpitations.   He denies any chest pain, pressure, shortness of breath, orthopnea or paroxysmal nocturnal dyspnea.      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  He had an echocardiogram in 03/2016 which was normal.  LVEF was 55 to 60% and no wall motion abnormalities or valvular disease were noted. He uses stationary bike on a daily basis and is able to do so without any problems.      Assessment and plan:   1.  History of atrial tachycardia/SVT  2.  PVCs  3.  Hypertension-well-controlled  4.  Incomplete RBBB and LAFB     The patient is currently doing well from a cardiovascular standpoint.  I will continue him on the current dose of AV loida blockage and have him come back and see me in a year.  I would prefer not to uptitrate metoprolol given incomplete right bundle branch block and left atrial fascicular block.    Thank you for allowing me to participate in the care of Vannesa Alvarez    This note was completed in part using Dragon voice recognition software. Although reviewed after completion, some word and grammatical errors may occur.    Shantanu Khan MD  Cardiology    Orders Placed This Encounter   Procedures     Follow-Up with Cardiologist       No orders of the defined types were placed in this encounter.      There are no discontinued medications.    Encounter Diagnosis   Name Primary?     Atrial tachycardia (H)        CURRENT MEDICATIONS:  Current Outpatient Medications   Medication Sig Dispense Refill     acetaminophen (TYLENOL) 325 MG tablet Take 2 tablets (650 mg) by mouth  every 4 hours as needed for mild pain or fever 100 tablet      ALLOPURINOL 300 MG OR TABS 1 TABLET BY MOUTH DAILY       aspirin 81 MG chewable tablet Take 1 tablet (81 mg) by mouth daily 90 tablet 3     cyanocobalamin 1000 MCG/ML injection Inject 1 mL into the muscle every 30 days Around the 8th. Gets at [facility]       indapamide (LOZOL) 2.5 MG tablet Take 5 mg by mouth daily       metoprolol succinate (TOPROL-XL) 25 MG 24 hr tablet Take 1 tablet (25 mg) by mouth daily every morning. 90 tablet 3     nystatin (MYCOSTATIN) cream Apply topically 2 times daily       OMEPRAZOLE PO Take 20 mg by mouth 2 times daily (before meals)       potassium chloride SA (K-DUR/KLOR-CON M) 10 MEQ CR tablet Take 20 mEq by mouth 3 times daily          ALLERGIES     Allergies   Allergen Reactions     Latex Itching     Cipro [Ciprofloxacin] Itching and Rash       PAST MEDICAL HISTORY:  Past Medical History:   Diagnosis Date     Cancer (H) 2014    Prostate Cancer, skin cancer     Gastro-oesophageal reflux disease      Syncope        PAST SURGICAL HISTORY:  No past surgical history on file.    FAMILY HISTORY:  No family history on file.    SOCIAL HISTORY:  Social History     Socioeconomic History     Marital status:      Spouse name: None     Number of children: None     Years of education: None     Highest education level: None   Occupational History     None   Social Needs     Financial resource strain: None     Food insecurity     Worry: None     Inability: None     Transportation needs     Medical: None     Non-medical: None   Tobacco Use     Smoking status: Former Smoker     Quit date: 1969     Years since quittin.8     Smokeless tobacco: Never Used   Substance and Sexual Activity     Alcohol use: None     Drug use: None     Sexual activity: None   Lifestyle     Physical activity     Days per week: None     Minutes per session: None     Stress: None   Relationships     Social connections     Talks on phone: None      Gets together: None     Attends [employer] service: None     Active member of club or organization: None     Attends meetings of clubs or organizations: None     Relationship status: None     Intimate partner violence     Fear of current or ex partner: None     Emotionally abused: None     Physically abused: None     Forced sexual activity: None   Other Topics Concern     Parent/sibling w/ CABG, MI or angioplasty before 65F 55M? Not Asked   Social History Narrative     None       General Appearance: No distress, normal body habitus, upright.  ENT/Mouth:  Normal head shape, no evidence of injury or laceration.  EYES: No scleral icterus, normal conjunctivae  Neck:  No evidence of thyromegaly  Chest/Lungs: No audible wheezing. Non labored breathing. No cough.  Cardiovascular: No evidence of elevated jugular venous pressure.   Abdomen:  No observed jaundice.  Extremities: No cyanosis.  Skin: No xanthelasma, normal skin collar. No evidence of facial lacerations.  Neurologic: No focal neurological defect. Normal speech.  Psychiatric: Alert and oriented x3    Phone call duration: 10 minutes      Thank you for allowing me to participate in the care of your patient.    Sincerely,     Shantanu Khan MD     [facility]

## 2021-02-18 ENCOUNTER — IMMUNIZATION (OUTPATIENT)
Dept: FAMILY MEDICINE | Facility: CLINIC | Age: 86
End: 2021-02-18
Payer: MEDICARE

## 2021-02-18 PROCEDURE — 91300 PR COVID VAC PFIZER DIL RECON 30 MCG/0.3 ML IM: CPT

## 2021-02-18 PROCEDURE — 0001A PR COVID VAC PFIZER DIL RECON 30 MCG/0.3 ML IM: CPT

## 2021-03-11 ENCOUNTER — IMMUNIZATION (OUTPATIENT)
Dept: FAMILY MEDICINE | Facility: CLINIC | Age: 86
End: 2021-03-11
Attending: FAMILY MEDICINE
Payer: MEDICARE

## 2021-03-11 PROCEDURE — 91300 PR COVID VAC PFIZER DIL RECON 30 MCG/0.3 ML IM: CPT

## 2021-03-11 PROCEDURE — 0002A PR COVID VAC PFIZER DIL RECON 30 MCG/0.3 ML IM: CPT

## 2021-03-27 ENCOUNTER — HEALTH MAINTENANCE LETTER (OUTPATIENT)
Age: 86
End: 2021-03-27

## 2021-05-27 ENCOUNTER — RECORDS - HEALTHEAST (OUTPATIENT)
Dept: ADMINISTRATIVE | Facility: CLINIC | Age: 86
End: 2021-05-27

## 2021-05-28 ENCOUNTER — RECORDS - HEALTHEAST (OUTPATIENT)
Dept: ADMINISTRATIVE | Facility: CLINIC | Age: 86
End: 2021-05-28

## 2021-09-11 ENCOUNTER — HEALTH MAINTENANCE LETTER (OUTPATIENT)
Age: 86
End: 2021-09-11

## 2022-03-25 ENCOUNTER — OFFICE VISIT (OUTPATIENT)
Dept: CARDIOLOGY | Facility: CLINIC | Age: 87
End: 2022-03-25
Payer: MEDICARE

## 2022-03-25 VITALS
HEART RATE: 70 BPM | BODY MASS INDEX: 29.35 KG/M2 | WEIGHT: 205 LBS | HEIGHT: 70 IN | DIASTOLIC BLOOD PRESSURE: 88 MMHG | OXYGEN SATURATION: 97 % | SYSTOLIC BLOOD PRESSURE: 131 MMHG

## 2022-03-25 DIAGNOSIS — I47.19 ATRIAL TACHYCARDIA (H): ICD-10-CM

## 2022-03-25 PROCEDURE — 99213 OFFICE O/P EST LOW 20 MIN: CPT | Performed by: INTERNAL MEDICINE

## 2022-03-25 RX ORDER — PROPRANOLOL HYDROCHLORIDE 80 MG/1
CAPSULE, EXTENDED RELEASE ORAL
COMMUNITY
Start: 2022-01-20

## 2022-03-25 RX ORDER — CARBIDOPA AND LEVODOPA 25; 100 MG/1; MG/1
TABLET ORAL
COMMUNITY
Start: 2022-03-14

## 2022-03-25 NOTE — NURSING NOTE
"Initial /88   Pulse 70   Ht 1.778 m (5' 10\")   Wt 93 kg (205 lb)   SpO2 97%   BMI 29.41 kg/m   Estimated body mass index is 29.41 kg/m  as calculated from the following:    Height as of this encounter: 1.778 m (5' 10\").    Weight as of this encounter: 93 kg (205 lb). .      "

## 2022-03-25 NOTE — PROGRESS NOTES
HPI and Plan:   Today, I had the pleasure of connecting with Vannesa Alvarez for a follow-up visit.  He is a pleasant 89 year old patient with a history of atrial tachyarrhythmias which along with dehydration caused an episode of syncope.  Since being placed on beta blockers, he has had no recurrent syncopal episodes.  He returns in annual followup.  I have previously seen him on video visit in 2020.  He is accompanied by his daughter     The patient has done well over the last 2 year.   Last Holter monitor for rhythm monitoring showed PVC burden of 2% and SVE burden of 1%.  The first SVT was at the rate of 138 bpm and lasted 5 beats.  Overall, this is mostly unchanged from the Holter monitor performed in 2017. He has had no recurrent syncope or palpitations.   He denies any chest pain, pressure, shortness of breath, orthopnea or paroxysmal nocturnal dyspnea.      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  He had an echocardiogram in 03/2016 which was normal.  LVEF was 55 to 60% and no wall motion abnormalities or valvular disease were noted. He uses stationary bike on a daily basis and is able to do so without any problems.  I reviewed all these tests today.     Assessment and plan:   1.  History of atrial tachycardia/SVT  2.  PVCs  3.  Hypertension-well-controlled  4.  Incomplete RBBB and LAFB     The patient is currently doing well from a cardiovascular standpoint.  I will continue him on the current dose of AV loida blockage and have him come back and see me in a year.  I would prefer not to uptitrate metoprolol given incomplete right bundle branch block and left atrial fascicular block.    Thank you for allowing me to participate in the care of Vannesa Alvarez    This note was completed in part using Dragon voice recognition software. Although reviewed after completion, some word and grammatical errors may  occur.    Shantanu Khan MD  Cardiology      No orders of the defined types were placed in this encounter.      Orders Placed This Encounter   Medications     propranolol ER (INDERAL LA) 80 MG 24 hr capsule     carbidopa-levodopa (SINEMET)  MG tablet       Medications Discontinued During This Encounter   Medication Reason     metoprolol succinate (TOPROL-XL) 25 MG 24 hr tablet Alternate therapy       Encounter Diagnosis   Name Primary?     Atrial tachycardia (H)        CURRENT MEDICATIONS:  Current Outpatient Medications   Medication Sig Dispense Refill     ALLOPURINOL 300 MG OR TABS 1 TABLET BY MOUTH DAILY       carbidopa-levodopa (SINEMET)  MG tablet        cyanocobalamin 1000 MCG/ML injection Inject 1 mL into the muscle every 30 days Around the 8th. Gets at [facility]       indapamide (LOZOL) 2.5 MG tablet Take 5 mg by mouth daily       nystatin (MYCOSTATIN) cream Apply topically 2 times daily       OMEPRAZOLE PO Take 20 mg by mouth 2 times daily (before meals)       potassium chloride SA (K-DUR/KLOR-CON M) 10 MEQ CR tablet Take 20 mEq by mouth 3 times daily        propranolol ER (INDERAL LA) 80 MG 24 hr capsule        acetaminophen (TYLENOL) 325 MG tablet Take 2 tablets (650 mg) by mouth every 4 hours as needed for mild pain or fever 100 tablet      aspirin 81 MG chewable tablet Take 1 tablet (81 mg) by mouth daily (Patient not taking: Reported on 3/25/2022) 90 tablet 3       ALLERGIES     Allergies   Allergen Reactions     Latex Itching     Cipro [Ciprofloxacin] Itching and Rash       PAST MEDICAL HISTORY:  Past Medical History:   Diagnosis Date     Cancer (H) 5/2014    Prostate Cancer, skin cancer     Gastro-oesophageal reflux disease      Syncope        PAST SURGICAL HISTORY:  Past Surgical History:   Procedure Laterality Date     APPENDECTOMY  1942     CHOLECYSTECTOMY         FAMILY HISTORY:  Family History   Problem Relation Age of Onset     Hypertension Father        SOCIAL HISTORY:  Social  "History     Socioeconomic History     Marital status:      Spouse name: None     Number of children: None     Years of education: None     Highest education level: None   Occupational History     None   Tobacco Use     Smoking status: Former Smoker     Quit date: 1969     Years since quittin.2     Smokeless tobacco: Never Used   Substance and Sexual Activity     Alcohol use: None     Drug use: None     Sexual activity: None   Other Topics Concern     Parent/sibling w/ CABG, MI or angioplasty before 65F 55M? Not Asked   Social History Narrative     None     Social Determinants of Health     Financial Resource Strain: Not on file   Food Insecurity: Not on file   Transportation Needs: Not on file   Physical Activity: Not on file   Stress: Not on file   Social Connections: Not on file   Intimate Partner Violence: Not on file   Housing Stability: Not on file       Review of Systems:  Skin:  Negative bruising     Eyes:  Positive for glasses Cataract surgery  ENT:  Positive for nasal congestion;postnasal drainage;hearing loss;tinnitus    Respiratory:  Positive for cough;shortness of breath     Cardiovascular:  Negative;chest pain;palpitations;dizziness;lightheadedness Positive for;lower extremity symptoms;edema Has parkinsons  Gastroenterology: Positive for excessive gas or bloating    Genitourinary:  Negative      Musculoskeletal:  Negative      Neurologic:  Negative numbness or tingling of feet;tremors;involuntary movement Parkinsons  Psychiatric:  Negative      Heme/Lymph/Imm:  Negative      Endocrine:  Negative        Physical Exam:  Vitals: /88   Pulse 70   Ht 1.778 m (5' 10\")   Wt 93 kg (205 lb)   SpO2 97%   BMI 29.41 kg/m    Eyes: No icterus.  Pulmonary: Chest symmetric, lungs clear bilaterally and no crackles, wheezes or rales.  Cardiovascular: RRR with normal S1 and S2, no murmur, JVP normal.  Musculoskeletal: Edema of the lower extremities: None.  Neurologic: Oriented and appropriate " without obvious focal deficits.   Psychiatric: Normal affect.     Recent Lab Results:  LIPID RESULTS:  Lab Results   Component Value Date    CHOL 152 09/03/2014    HDL 50 09/03/2014    LDL 76 09/03/2014    TRIG 129 09/03/2014    CHOLHDLRATIO 3.0 09/03/2014       LIVER ENZYME RESULTS:  Lab Results   Component Value Date    AST 28 04/03/2018    ALT 18 04/03/2018       CBC RESULTS:  Lab Results   Component Value Date    WBC 5.4 04/03/2018    RBC 4.42 04/03/2018    HGB 14.4 04/03/2018    HCT 40.7 04/03/2018    MCV 92 04/03/2018    MCH 32.6 04/03/2018    MCHC 35.4 04/03/2018    RDW 13.1 04/03/2018     (L) 04/03/2018       BMP RESULTS:  Lab Results   Component Value Date     04/03/2018    POTASSIUM 3.1 (L) 04/03/2018    CHLORIDE 99 04/03/2018    CO2 32 04/03/2018    ANIONGAP 5 04/03/2018    GLC 94 04/03/2018    BUN 13 04/03/2018    CR 1.11 04/03/2018    GFRESTIMATED 63 04/03/2018    GFRESTBLACK 76 04/03/2018    VANIA 8.4 (L) 04/03/2018        A1C RESULTS:  No results found for: A1C    INR RESULTS:  No results found for: INR    CC  Referred Self, MD  No address on file    All medical records were reviewed in detail and discussed with the patient. Greater than 30 mins were spent with the patient, 50% of this time was spent on counseling and coordination of care.  After visit summary was printed and given to the patient.

## 2022-03-25 NOTE — LETTER
3/25/2022    Hima Lott MD  [facility] [address] [address]    RE: Vannesa Alvarez       Dear Colleague,     I had the pleasure of seeing Vannesa Alvarez in the [facility].  HPI and Plan:   Today, I had the pleasure of connecting with Vannesa Alvarez for a follow-up visit.  He is a pleasant 89 year old patient with a history of atrial tachyarrhythmias which along with dehydration caused an episode of syncope.  Since being placed on beta blockers, he has had no recurrent syncopal episodes.  He returns in annual followup.  I have previously seen him on video visit in 2020.  He is accompanied by his daughter     The patient has done well over the last 2 year.   Last Holter monitor for rhythm monitoring showed PVC burden of 2% and SVE burden of 1%.  The first SVT was at the rate of 138 bpm and lasted 5 beats.  Overall, this is mostly unchanged from the Holter monitor performed in 2017. He has had no recurrent syncope or palpitations.   He denies any chest pain, pressure, shortness of breath, orthopnea or paroxysmal nocturnal dyspnea.      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  He had an echocardiogram in 03/2016 which was normal.  LVEF was 55 to 60% and no wall motion abnormalities or valvular disease were noted. He uses stationary bike on a daily basis and is able to do so without any problems.  I reviewed all these tests today.     Assessment and plan:   1.  History of atrial tachycardia/SVT  2.  PVCs  3.  Hypertension-well-controlled  4.  Incomplete RBBB and LAFB     The patient is currently doing well from a cardiovascular standpoint.  I will continue him on the current dose of AV loida blockage and have him come back and see me in a year.  I would prefer not to uptitrate metoprolol given incomplete right bundle branch block and left atrial fascicular  pankaj.    Thank you for allowing me to participate in the care of Vannesa Alvarez    This note was completed in part using Dragon voice recognition software. Although reviewed after completion, some word and grammatical errors may occur.    Shantanu Khan MD  Cardiology      No orders of the defined types were placed in this encounter.      Orders Placed This Encounter   Medications     propranolol ER (INDERAL LA) 80 MG 24 hr capsule     carbidopa-levodopa (SINEMET)  MG tablet       Medications Discontinued During This Encounter   Medication Reason     metoprolol succinate (TOPROL-XL) 25 MG 24 hr tablet Alternate therapy       Encounter Diagnosis   Name Primary?     Atrial tachycardia (H)        CURRENT MEDICATIONS:  Current Outpatient Medications   Medication Sig Dispense Refill     ALLOPURINOL 300 MG OR TABS 1 TABLET BY MOUTH DAILY       carbidopa-levodopa (SINEMET)  MG tablet        cyanocobalamin 1000 MCG/ML injection Inject 1 mL into the muscle every 30 days Around the 8th. Gets at [facility]       indapamide (LOZOL) 2.5 MG tablet Take 5 mg by mouth daily       nystatin (MYCOSTATIN) cream Apply topically 2 times daily       OMEPRAZOLE PO Take 20 mg by mouth 2 times daily (before meals)       potassium chloride SA (K-DUR/KLOR-CON M) 10 MEQ CR tablet Take 20 mEq by mouth 3 times daily        propranolol ER (INDERAL LA) 80 MG 24 hr capsule        acetaminophen (TYLENOL) 325 MG tablet Take 2 tablets (650 mg) by mouth every 4 hours as needed for mild pain or fever 100 tablet      aspirin 81 MG chewable tablet Take 1 tablet (81 mg) by mouth daily (Patient not taking: Reported on 3/25/2022) 90 tablet 3       ALLERGIES     Allergies   Allergen Reactions     Latex Itching     Cipro [Ciprofloxacin] Itching and Rash       PAST MEDICAL HISTORY:  Past Medical History:   Diagnosis Date     Cancer (H) 5/2014    Prostate Cancer, skin cancer     Gastro-oesophageal reflux disease      Syncope        PAST SURGICAL  "HISTORY:  Past Surgical History:   Procedure Laterality Date     APPENDECTOMY       CHOLECYSTECTOMY         FAMILY HISTORY:  Family History   Problem Relation Age of Onset     Hypertension Father        SOCIAL HISTORY:  Social History     Socioeconomic History     Marital status:      Spouse name: None     Number of children: None     Years of education: None     Highest education level: None   Occupational History     None   Tobacco Use     Smoking status: Former Smoker     Quit date: 1969     Years since quittin.2     Smokeless tobacco: Never Used   Substance and Sexual Activity     Alcohol use: None     Drug use: None     Sexual activity: None   Other Topics Concern     Parent/sibling w/ CABG, MI or angioplasty before 65F 55M? Not Asked   Social History Narrative     None     Social Determinants of Health     Financial Resource Strain: Not on file   Food Insecurity: Not on file   Transportation Needs: Not on file   Physical Activity: Not on file   Stress: Not on file   Social Connections: Not on file   Intimate Partner Violence: Not on file   Housing Stability: Not on file       Review of Systems:  Skin:  Negative bruising     Eyes:  Positive for glasses Cataract surgery  ENT:  Positive for nasal congestion;postnasal drainage;hearing loss;tinnitus    Respiratory:  Positive for cough;shortness of breath     Cardiovascular:  Negative;chest pain;palpitations;dizziness;lightheadedness Positive for;lower extremity symptoms;edema Has parkinsons  Gastroenterology: Positive for excessive gas or bloating    Genitourinary:  Negative      Musculoskeletal:  Negative      Neurologic:  Negative numbness or tingling of feet;tremors;involuntary movement Parkinsons  Psychiatric:  Negative      Heme/Lymph/Imm:  Negative      Endocrine:  Negative        Physical Exam:  Vitals: /88   Pulse 70   Ht 1.778 m (5' 10\")   Wt 93 kg (205 lb)   SpO2 97%   BMI 29.41 kg/m    Eyes: No icterus.  Pulmonary: Chest " symmetric, lungs clear bilaterally and no crackles, wheezes or rales.  Cardiovascular: RRR with normal S1 and S2, no murmur, JVP normal.  Musculoskeletal: Edema of the lower extremities: None.  Neurologic: Oriented and appropriate without obvious focal deficits.   Psychiatric: Normal affect.     Recent Lab Results:  LIPID RESULTS:  Lab Results   Component Value Date    CHOL 152 09/03/2014    HDL 50 09/03/2014    LDL 76 09/03/2014    TRIG 129 09/03/2014    CHOLHDLRATIO 3.0 09/03/2014       LIVER ENZYME RESULTS:  Lab Results   Component Value Date    AST 28 04/03/2018    ALT 18 04/03/2018       CBC RESULTS:  Lab Results   Component Value Date    WBC 5.4 04/03/2018    RBC 4.42 04/03/2018    HGB 14.4 04/03/2018    HCT 40.7 04/03/2018    MCV 92 04/03/2018    MCH 32.6 04/03/2018    MCHC 35.4 04/03/2018    RDW 13.1 04/03/2018     (L) 04/03/2018       BMP RESULTS:  Lab Results   Component Value Date     04/03/2018    POTASSIUM 3.1 (L) 04/03/2018    CHLORIDE 99 04/03/2018    CO2 32 04/03/2018    ANIONGAP 5 04/03/2018    GLC 94 04/03/2018    BUN 13 04/03/2018    CR 1.11 04/03/2018    GFRESTIMATED 63 04/03/2018    GFRESTBLACK 76 04/03/2018    VANIA 8.4 (L) 04/03/2018        A1C RESULTS:  No results found for: A1C    INR RESULTS:  No results found for: INR    CC  Referred Self, MD  No address on file    All medical records were reviewed in detail and discussed with the patient. Greater than 30 mins were spent with the patient, 50% of this time was spent on counseling and coordination of care.  After visit summary was printed and given to the patient.    Thank you for allowing me to participate in the care of your patient.      Sincerely,     Shantanu Khan MD     [facility]  cc:   Referred Self

## 2022-03-30 ENCOUNTER — OFFICE VISIT (OUTPATIENT)
Dept: URGENT CARE | Facility: URGENT CARE | Age: 87
End: 2022-03-30
Payer: MEDICARE

## 2022-03-30 VITALS
DIASTOLIC BLOOD PRESSURE: 68 MMHG | SYSTOLIC BLOOD PRESSURE: 96 MMHG | RESPIRATION RATE: 18 BRPM | BODY MASS INDEX: 29.13 KG/M2 | HEART RATE: 76 BPM | OXYGEN SATURATION: 95 % | WEIGHT: 203 LBS | TEMPERATURE: 95.9 F

## 2022-03-30 DIAGNOSIS — A08.4 VIRAL GASTROENTERITIS: Primary | ICD-10-CM

## 2022-03-30 DIAGNOSIS — K64.4 EXTERNAL HEMORRHOIDS: ICD-10-CM

## 2022-03-30 PROBLEM — I47.19 ATRIAL TACHYCARDIA (H): Status: ACTIVE | Noted: 2021-02-08

## 2022-03-30 PROBLEM — G20.C PRIMARY PARKINSONISM (H): Status: ACTIVE | Noted: 2022-03-04

## 2022-03-30 PROCEDURE — 99204 OFFICE O/P NEW MOD 45 MIN: CPT | Performed by: PHYSICIAN ASSISTANT

## 2022-03-30 RX ORDER — CLOTRIMAZOLE 1 %
CREAM (GRAM) TOPICAL
COMMUNITY
Start: 2021-10-25

## 2022-03-30 RX ORDER — FLUOROURACIL 50 MG/G
CREAM TOPICAL
COMMUNITY
Start: 2021-06-02

## 2022-03-30 RX ORDER — LOPERAMIDE HYDROCHLORIDE 2 MG/1
2 TABLET ORAL 4 TIMES DAILY PRN
Qty: 30 TABLET | Refills: 0 | Status: SHIPPED | OUTPATIENT
Start: 2022-03-30

## 2022-03-30 RX ORDER — AZELASTINE 1 MG/ML
SPRAY, METERED NASAL
COMMUNITY
Start: 2022-02-25

## 2022-03-30 RX ORDER — TRIAMCINOLONE ACETONIDE 1 MG/G
CREAM TOPICAL
COMMUNITY
Start: 2021-11-29

## 2022-03-30 ASSESSMENT — ENCOUNTER SYMPTOMS
HEADACHES: 0
VOMITING: 0
MUSCULOSKELETAL NEGATIVE: 1
CHEST TIGHTNESS: 0
CHILLS: 0
SORE THROAT: 0
DIARRHEA: 1
DYSURIA: 0
HEMATURIA: 0
ALLERGIC/IMMUNOLOGIC NEGATIVE: 1
SHORTNESS OF BREATH: 0
CARDIOVASCULAR NEGATIVE: 1
PALPITATIONS: 0
ABDOMINAL PAIN: 0
CONSTITUTIONAL NEGATIVE: 1
COUGH: 0
BLOOD IN STOOL: 1
NEUROLOGICAL NEGATIVE: 1
FEVER: 0
MYALGIAS: 0
NAUSEA: 0
RESPIRATORY NEGATIVE: 1
FREQUENCY: 0
WHEEZING: 0

## 2022-03-30 NOTE — PROGRESS NOTES
Chief Complaint:    Chief Complaint   Patient presents with     Diarrhea     X 3-4 days with blood in the stool. Has had hemorroids in the past.       Medical Decision Making:    Vital signs reviewed by Piyush Slaughter PA-C  BP 96/68 (BP Location: Right arm, Patient Position: Sitting, Cuff Size: Adult Regular)   Pulse 76   Temp (!) 95.9  F (35.5  C) (Tympanic)   Resp 18   Wt 92.1 kg (203 lb)   SpO2 95%   BMI 29.13 kg/m      Differential Diagnosis:  Viral gastroenteritis, food poisoning, hemorrhoids.     ASSESSMENT:     1. Viral gastroenteritis    2. External hemorrhoids           PLAN:     Patient is in no acute distress.  He is afebrile with borderline hypotension.  Abdominal exam was benign.  He does have 1 small external hemorrhoid.  No bleeding from the anus visualized.    BRAT diet discussed.  Rx for Imodium sent in.  Push fluids.  Patient instructed to follow up with PCP in 1 week if symptoms are not improving.  Sooner if symptoms worsen.  Worrisome symptoms discussed with instructions to go to the ED.  Patient and family member verbalized understanding and agreed with this plan.    Labs:     No results found for any visits on 03/30/22.    Current Meds:    Current Outpatient Medications:      acetaminophen (TYLENOL) 325 MG tablet, Take 2 tablets (650 mg) by mouth every 4 hours as needed for mild pain or fever, Disp: 100 tablet, Rfl:      ALLOPURINOL 300 MG OR TABS, 1 TABLET BY MOUTH DAILY, Disp: , Rfl:      azelastine (ASTELIN) 0.1 % nasal spray, azelastine 137 mcg (0.1 %) nasal spray aerosol, Disp: , Rfl:      carbidopa-levodopa (SINEMET)  MG tablet, , Disp: , Rfl:      clotrimazole (LOTRIMIN) 1 % external cream, clotrimazole 1 % topical cream, Disp: , Rfl:      cyanocobalamin 1000 MCG/ML injection, Inject 1 mL into the muscle every 30 days Around the 8th. Gets at [facility], Disp: , Rfl:      fluorouracil (EFUDEX) 5 % external cream, fluorouracil 5 % topical cream, Disp: , Rfl:       indapamide (LOZOL) 2.5 MG tablet, Take 5 mg by mouth daily, Disp: , Rfl:      loperamide (IMODIUM A-D) 2 MG tablet, Take 1 tablet (2 mg) by mouth 4 times daily as needed for diarrhea, Disp: 30 tablet, Rfl: 0     nystatin (MYCOSTATIN) cream, Apply topically 2 times daily, Disp: , Rfl:      OMEPRAZOLE PO, Take 20 mg by mouth 2 times daily (before meals), Disp: , Rfl:      potassium chloride SA (K-DUR/KLOR-CON M) 10 MEQ CR tablet, Take 20 mEq by mouth 3 times daily , Disp: , Rfl:      propranolol ER (INDERAL LA) 80 MG 24 hr capsule, , Disp: , Rfl:      propranolol SR BEADS (INDREAL XL) 80 MG 24 hr capsule, propranolol ER 80 mg capsule,24 hr,extended release, Disp: , Rfl:      triamcinolone (KENALOG) 0.1 % external cream, triamcinolone acetonide 0.1 % topical cream, Disp: , Rfl:      aspirin 81 MG chewable tablet, Take 1 tablet (81 mg) by mouth daily (Patient not taking: Reported on 3/25/2022), Disp: 90 tablet, Rfl: 3    Allergies:  Allergies   Allergen Reactions     Latex Itching     Cipro [Ciprofloxacin] Itching and Rash       SUBJECTIVE    HPI: Vannesa Alvarez is an 89 year old male who presents for evaluation and treatment of bloody diarrhea.  Patient is here with family member.  Patient has had hemorrhoids in the past. The symptoms started 2-3 days ago and have not changed.  Yesterday he noticed 2 drops of bright red blood on the bathroom floor.  He has not noticed any blood in the toilet.        He denies any fever, chills, dizziness, nausea, vomiting, or recent travel.  No chest pain or SOB.        Patient is [facility] to [facility].    ROS:      Review of Systems   Constitutional: Negative.  Negative for chills and fever.   HENT: Negative.  Negative for sore throat.    Respiratory: Negative.  Negative for cough, chest tightness, shortness of breath and wheezing.    Cardiovascular: Negative.  Negative for chest pain and palpitations.   Gastrointestinal: Positive for blood in stool and diarrhea. Negative for  abdominal pain, nausea and vomiting.   Genitourinary: Negative for dysuria, frequency, hematuria and urgency.   Musculoskeletal: Negative.  Negative for myalgias.   Skin: Negative for rash.   Allergic/Immunologic: Negative.  Negative for immunocompromised state.   Neurological: Negative.  Negative for headaches.        Family History   Family History   Problem Relation Age of Onset     Hypertension Father        Social History  Social History     Socioeconomic History     Marital status:      Spouse name: Not on file     Number of children: Not on file     Years of education: Not on file     Highest education level: Not on file   Occupational History     Not on file   Tobacco Use     Smoking status: Former Smoker     Quit date: 1969     Years since quittin.2     Smokeless tobacco: Never Used   Substance and Sexual Activity     Alcohol use: Not on file     Drug use: Not on file     Sexual activity: Not on file   Other Topics Concern     Parent/sibling w/ CABG, MI or angioplasty before 65F 55M? Not Asked   Social History Narrative     Not on file     Social Determinants of Health     Financial Resource Strain: Not on file   Food Insecurity: Not on file   Transportation Needs: Not on file   Physical Activity: Not on file   Stress: Not on file   Social Connections: Not on file   Intimate Partner Violence: Not on file   Housing Stability: Not on file        Surgical History:  Past Surgical History:   Procedure Laterality Date     APPENDECTOMY       CHOLECYSTECTOMY          Problem List:  Patient Active Problem List   Diagnosis     Syncope     Pneumonia     Hypoxia     Atrial tachycardia (H)     Carcinoid tumor of stomach     Essential hypertension with goal blood pressure less than 140/90     Hemorrhoids     History of malignant neoplasm of prostate     Primary parkinsonism (H)           OBJECTIVE:     Vital signs noted and reviewed by Piyush Slaughter PA-C  BP 96/68 (BP Location: Right arm, Patient  Position: Sitting, Cuff Size: Adult Regular)   Pulse 76   Temp (!) 95.9  F (35.5  C) (Tympanic)   Resp 18   Wt 92.1 kg (203 lb)   SpO2 95%   BMI 29.13 kg/m       PEFR:    Physical Exam  Vitals and nursing note reviewed.   Constitutional:       General: He is not in acute distress.     Appearance: He is well-developed. He is not ill-appearing, toxic-appearing or diaphoretic.   HENT:      Head: Normocephalic and atraumatic.      Right Ear: Hearing, tympanic membrane, ear canal and external ear normal. Tympanic membrane is not perforated, erythematous, retracted or bulging.      Left Ear: Hearing, tympanic membrane, ear canal and external ear normal. Tympanic membrane is not perforated, erythematous, retracted or bulging.      Nose: Nose normal. No mucosal edema, congestion or rhinorrhea.      Mouth/Throat:      Pharynx: No oropharyngeal exudate or posterior oropharyngeal erythema.      Tonsils: No tonsillar exudate or tonsillar abscesses. 0 on the right. 0 on the left.   Eyes:      Pupils: Pupils are equal, round, and reactive to light.   Cardiovascular:      Rate and Rhythm: Normal rate and regular rhythm.      Heart sounds: Normal heart sounds, S1 normal and S2 normal. Heart sounds not distant. No murmur heard.    No friction rub. No gallop.   Pulmonary:      Effort: Pulmonary effort is normal. No respiratory distress.      Breath sounds: Normal breath sounds. No decreased breath sounds, wheezing, rhonchi or rales.   Abdominal:      General: Bowel sounds are normal. There is no distension.      Palpations: Abdomen is soft.      Tenderness: There is no abdominal tenderness.   Genitourinary:     Rectum: External hemorrhoid present. No mass, tenderness or anal fissure. Normal anal tone.       Musculoskeletal:      Cervical back: Normal range of motion and neck supple.   Lymphadenopathy:      Cervical: No cervical adenopathy.   Skin:     General: Skin is warm and dry.      Findings: No rash.   Neurological:       Mental Status: He is alert.      Cranial Nerves: No cranial nerve deficit.   Psychiatric:         Attention and Perception: He is attentive.         Speech: Speech normal.         Behavior: Behavior normal. Behavior is cooperative.         Thought Content: Thought content normal.         Judgment: Judgment normal.             Piyush Slaughter PA-C  3/30/2022, 11:41 AM

## 2022-04-18 ENCOUNTER — IMMUNIZATION (OUTPATIENT)
Dept: FAMILY MEDICINE | Facility: CLINIC | Age: 87
End: 2022-04-18
Payer: MEDICARE

## 2022-04-18 PROCEDURE — 91305 COVID-19,PF,PFIZER (12+ YRS): CPT

## 2022-04-18 PROCEDURE — 0054A COVID-19,PF,PFIZER (12+ YRS): CPT

## 2022-04-23 ENCOUNTER — HEALTH MAINTENANCE LETTER (OUTPATIENT)
Age: 87
End: 2022-04-23

## 2022-10-29 ENCOUNTER — HEALTH MAINTENANCE LETTER (OUTPATIENT)
Age: 87
End: 2022-10-29

## 2023-04-08 ENCOUNTER — HEALTH MAINTENANCE LETTER (OUTPATIENT)
Age: 88
End: 2023-04-08

## 2023-10-27 NOTE — H&P (VIEW-ONLY)
[facility]      Preoperative Consultation     Vannesa Alvarez   : 1930   Gender: Male     Date of Encounter: 10/27/2023     Date of Procedure: 11/15/2023      Nursing Notes:   Maggie Vizcaino  10/27/2023 11:51 AM  Sign at exiting of workspace  Chief Complaint   Patient presents with     Pre-Op Exam     11/15/23 [facility]       Additional visit information (chief complaint/health maintenance) shared by patient:     There are no preventive care reminders to display for this patient.    Health maintenance reviewed with patient No    Patient presents for an in-person office visit: alone  Communication Method: Patient is active on [employer] and has been instructed that results/communications will be made via [employer]  If a phone call is needed, the preferred number is:  Mobile   Home Phone [phone]   Mobile [phone]     May we leave a detailed message at this number? Yes    Vannesa Alvarez is a 89 y.o. male (1930) who presents for preop evaluation undergoing Bilateral direct browplasty   Date of Surgery: 11/15/23  Surgical Specialty: Ophthalmology  Ruth Hatch MD - [facility]  Hospital/Surgical Facility: [facility]  Fax number: [phone]   Surgery type: outpatient  Primary Physician: Hima Lott CMA ......10/27/2023  11:51 AM              Anesthesia: Regional     History of Present Illness   Vannesa Alvarez is a 89 y.o. male here for preoperative medical clearance exam for bilateral brow plasty for treatment of upper eyelid ptosis.  Here with his daughter.  Has had progressive ptosis to the degree that it feels like a shade over his eyes.      He is doing reasonably well at his advanced age.  His chronic medical issues including by GI carcinoid tumor, prediabetes, prior prostate cancer, hypertension, Parkinson's, and MGUS are all stable.  His blood pressure looks good.  He has had  no recent fevers, flulike symptoms, or known ill contacts.  He has had no recent changes with his medications and tolerates them all well.  He has had no prior complications with surgery or anesthesia.     Review of Systems   A comprehensive review of systems was negative except for items noted in HPI.     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 Date     Actinic keratosis 12/8/2006     Anemia      Atrial tachycardia      BPH LOCALIZED W/O URINARY OBSTR AND OTHR (LUTS) 12/8/2006     Carcinoid tumor of stomach      Cough      Delayed emergence from anesthesia     per pt report     Dysphonia 02/2022     Essential hypertension with goal blood pressure less than 140/90      Gout     with other specified  manifestations     Gout with other specified manifestations(274.89) 12/8/2006     HEMORRHOIDS 12/8/2006     HYPERTENSION 12/8/2006     Idiopathic chronic gout of multiple sites without tophus      Pernicious anemia      Pre-diabetes      Prostate cancer (HC)      Thrombocytopenia, secondary      Tremor      UMBILICAL HERNIA 12/8/2006     Unspecified hemorrhoids without mention of complication      Vitamin B 12 deficiency        Past Surgical History:   . Laterality Date     (IA) AR CRYOSURG ABLATION OF PROSTATE  10/31/14      APPENDECTOMY      Appendectomy     Bladder biopsy      At least twice     CATARACT REMOVAL Right 2009     CATARACT REMOVAL Left 2009     CHOLECYSTECTOMY       Direct microlaryngoscopy with vocal cord injection   2022     MICRO DIRECT LARYNGOSCOPY WITH BIOPSY   10/04/2018     TRANSURETHRAL RESECTION OF PROSTATE  2004       Social History     Tobacco Use   Smoking Status Former     Current packs/day: 0.00     Average packs/day: 1.5 packs/day for 20.5 years (30.7 ttl pk-yrs)     Types: Cigarettes     Start date: 1948     Quit date: 1968     Years since quittin.1   Smokeless Tobacco Never   Tobacco Comments    PT QUIT  !!!!       Social History    Social History Narrative      Not on file      Family History   Problem Relation Age of Onset     Other Father         COPD, EMPYSEMA     Stroke Mother      Heart Disease Brother         TRIPLE BYPASS AGE 48, BYPASS AGE 77     Heart Disease Sister      Good Health Brother      Allergies Brother         Allergies:   Allergies   Allergen Reactions     Cephalexin Vomiting     Cipro [Ciprofloxacin] Rash and Itching     BLOTCHY      Latex Itching     Sulfamethoxazole-Trimethoprim Itching     Latex Allergy: no    Current Outpatient Rx   Medication Sig Dispense Refill     allopurinoL (ZYLOPRIM) 300 mg tablet Take 1 Tablet (300 mg) by mouth once daily. 90 Tablet 3     azelastine 137 mcg/actuation (ASTELIN) nasal spray Inhale 1 Spray into affected nostril(s) two times daily. 30 mL 3     carbidopa-levodopa,  mg, (SINEMET )  mg tablet Take 2 Tablets by mouth three times daily.  0     clotrimazole (LOTRIMIN) 1 % cream Apply topically to affected area(s) 2 times daily. 60 g 1     fluorouracil 5% topical (EFUDEX) 5 % cream Use as directed. Prescribed by Dermatology. Completes course in two weeks from 2021.       indapamide (LOZOL) 2.5 mg tablet Take 2 Tablets (5 mg) by mouth once daily. 180 Tablet 3     medication order composer Blum  "2 take one tablet by mouth twice daily  0     potassium chloride (K-DUR, KLOR-CON M10) 10 mEq tablet TAKE THREE TABLETS BY MOUTH TWICE A DAY WITH MEALS 540 Tablet 2     propranolol ER (INDERAL LA) 80 mg Cs24 Sustained-Release capsule Take 1 Capsule (80 mg) by mouth once daily. 90 Capsule 3     triamcinolone (ARISTOCORT; KENALOG) 0.1 % cream APPLY TO AFFECTED AREA(S) TOPICALLY TO AFFECTED AREA(S) 3 TIMES DAILY FOR 1-2 WEEKS 30 g 2     Medications have been reviewed by me and are current to the best of my knowledge and ability.       Recent use of: no recent use of aspirin (ASA), NSAIDS or steroids    Anesthesia Complications: None  History of abnormal bleeding: None  History of Blood Transfusions: No    Health Care Directive or Living Will: yes       Physical Exam   /64 (Cuff Site: Right Arm, Position: Sitting, Cuff Size: Adult Regular)   Pulse 64   Ht 1.765 m (5' 9.5\")   Wt 90.3 kg (199 lb)   SpO2 97%   BMI 28.97 kg/m     General Appearance: No distress - comfortable  Head Exam: Normocephalic, without obvious abnormality.  Eye Exam: Normal external eye, conjunctiva, lids, cornea. BAR.  Ear Exam: Normal TM's bilaterally. Normal auditory canals and external ears. Non-tender.  Nose Exam: Normal external nose, mucus membranes, and septum.  OroPharynx Exam: Dental hygiene adequate. Normal buccal mucosa. Normal pharynx.  Neck Exam: Supple, no masses or nodes.  Thyroid Exam: No nodules or enlargement.  Chest/Respiratory Exam: Normal chest wall and respirations. Clear to auscultation.  Cardiovascular Exam: Regular rate and rhythm. S1, S2, no murmur, click, gallop, or rubs.  Gastrointestinal Exam: Soft, nontender, no abnormal masses or organomegaly.  Lymphatic Exam: Normal.  Skin: No rash or abnormalities.  Neurologic Exam: Non-focal, normal gross motor movement, tone, and coordination. No tremor.  Psychiatric Exam: Alert and oriented, appropriate affect.     Diagnostics     1. EKG: EKG FINDINGS - not " indicated  2. CXR: not indicated  3. Labs: none indicated     Assessment / Plan     The Pre-Op Tool    Recommendations        Labs  No routine labs indicated  EKG  Not indicated  Stress Testing  Not indicated    * Testing recommendations are intended to assist, but not direct, clinical decisions.           For minimal risk procedures, most medications (including anticoagulation) can be continued through the procedure.    * Medication recommendations are not intended to be exhaustive; they are limited to common medications that are potentially dangerous if incorrectly managed          * High quality data supports elimination of all preoperative testing in clinically stable patients prior to cataract surgery. A study randomized 18,189 patients scheduled for elective cataract surgery to 'routine' or to no testing. Only patients with a history of recent MI were excluded. The incidence of preoperative adverse events were identical in both groups with no subgroup of patients benefiting from testing on subsequent analysis (N Engl J Med 2000; 342:168-75).     Session ID: [id number]  Endnotes and bibliography available upon request: info@[email or web address]        ICD-10-CM    1. Preop examination  Z01.818       2. Brow ptosis, bilateral  H57.813           Pre-operative exam for bilateral brow plasty.    For above listed surgery and anesthesia:   Patient is low risk for perioperative complications.    RECOMMENDATIONS:   #1: Proceed without further diagnostic evaluation.  #2:  Perioperative medication management recommendations as above.  These were reviewed with the patient and printed on their AVS.  #3: Postoperative management through treating surgeon.     Hima Lott MD

## 2023-11-08 RX ORDER — METOPROLOL SUCCINATE 50 MG/1
TABLET, EXTENDED RELEASE ORAL
COMMUNITY

## 2023-11-14 ENCOUNTER — ANESTHESIA EVENT (OUTPATIENT)
Dept: SURGERY | Facility: CLINIC | Age: 88
End: 2023-11-14
Payer: MEDICARE

## 2023-11-14 ASSESSMENT — LIFESTYLE VARIABLES: TOBACCO_USE: 1

## 2023-11-14 ASSESSMENT — ENCOUNTER SYMPTOMS: DYSRHYTHMIAS: 1

## 2023-11-14 NOTE — ANESTHESIA PREPROCEDURE EVALUATION
Anesthesia Pre-Procedure Evaluation    Patient: Vannesa Alvarez   MRN: [id number] : 1930        Procedure : Procedure(s):  Bilateral direct browplasty          Past Medical History:   Diagnosis Date    Cancer (H) 2014    Prostate Cancer, skin cancer    Gastro-oesophageal reflux disease     Syncope       Past Surgical History:   Procedure Laterality Date    APPENDECTOMY      CHOLECYSTECTOMY        Allergies   Allergen Reactions    Latex Itching    Cipro [Ciprofloxacin] Itching and Rash      Social History     Tobacco Use    Smoking status: Former     Types: Cigarettes     Quit date: 1969     Years since quittin.9    Smokeless tobacco: Never   Substance Use Topics    Alcohol use: Not on file      Wt Readings from Last 1 Encounters:   22 92.1 kg (203 lb)        Anesthesia Evaluation   Pt has had prior anesthetic. Type: General.    No history of anesthetic complications       ROS/MED HX  ENT/Pulmonary:     (+)                tobacco use, Past use,            recent URI,          Neurologic:     (+)                 Parkinson's disease,               Cardiovascular: Comment: Echo: 2016     Technically difficult study.  Left ventricular systolic function is normal.  The visual ejection fraction is estimated at 55-60%.      (+)  hypertension- -   -  - -             fainting (syncope).           dysrhythmias (atrial tachycardia),              METS/Exercise Tolerance:     Hematologic:       Musculoskeletal:   (+)  arthritis,             GI/Hepatic:     (+) GERD,                   Renal/Genitourinary:  - neg Renal ROS     Endo:  - neg endo ROS   (+)               Obesity,       Psychiatric/Substance Use:       Infectious Disease:  - neg infectious disease ROS     Malignancy:   (+) Malignancy,     Other:            Physical Exam    Airway  airway exam normal           Respiratory Devices and Support         Dental       (+) Modest Abnormalities - crowns, retainers, 1 or 2 missing teeth and  "Removable bridges or other hardware      Cardiovascular   cardiovascular exam normal          Pulmonary   pulmonary exam normal                OUTSIDE LABS:  CBC:   Lab Results   Component Value Date    WBC 5.4 04/03/2018    WBC 14.2 (H) 11/02/2014    HGB 14.4 04/03/2018    HGB 15.4 11/02/2014    HCT 40.7 04/03/2018    HCT 42.3 11/02/2014     (L) 04/03/2018     11/02/2014     BMP:   Lab Results   Component Value Date     04/03/2018     (L) 11/02/2014    POTASSIUM 3.1 (L) 04/03/2018    POTASSIUM 3.1 (L) 11/02/2014    CHLORIDE 99 04/03/2018    CHLORIDE 90 (L) 11/02/2014    CO2 32 04/03/2018    CO2 26 11/02/2014    BUN 13 04/03/2018    BUN 14 11/02/2014    CR 1.11 04/03/2018    CR 1.18 11/02/2014    GLC 94 04/03/2018     (H) 11/02/2014     COAGS: No results found for: \"PTT\", \"INR\", \"FIBR\"  POC: No results found for: \"BGM\", \"HCG\", \"HCGS\"  HEPATIC:   Lab Results   Component Value Date    ALBUMIN 3.4 04/03/2018    PROTTOTAL 7.5 04/03/2018    ALT 18 04/03/2018    AST 28 04/03/2018    ALKPHOS 85 04/03/2018    BILITOTAL 0.7 04/03/2018     OTHER:   Lab Results   Component Value Date    VANIA 8.4 (L) 04/03/2018    LIPASE 58 (L) 11/02/2014       Anesthesia Plan    ASA Status:  3    NPO Status:  NPO Appropriate    Anesthesia Type: MAC.     - Reason for MAC: straight local not clinically adequate      Maintenance: Balanced.        Consents    Anesthesia Plan(s) and associated risks, benefits, and realistic alternatives discussed. Questions answered and patient/representative(s) expressed understanding.     - Discussed: Risks, Benefits and Alternatives for the PROCEDURE were discussed     - Discussed with:  Patient, Other (See Comment)            Postoperative Care    Pain management: IV analgesics, Oral pain medications.        Comments:           H&P reviewed: Unable to attach H&P to encounter due to EHR limitations. H&P Update: appropriate H&P reviewed, patient examined. No interval changes since " H&P (within 30 days).         ABE Russo CRNA

## 2023-11-15 ENCOUNTER — ANESTHESIA (OUTPATIENT)
Dept: SURGERY | Facility: CLINIC | Age: 88
End: 2023-11-15
Payer: MEDICARE

## 2023-11-15 ENCOUNTER — HOSPITAL ENCOUNTER (OUTPATIENT)
Facility: CLINIC | Age: 88
Discharge: HOME | End: 2023-11-15
Attending: OPHTHALMOLOGY | Admitting: OPHTHALMOLOGY
Payer: MEDICARE

## 2023-11-15 VITALS
WEIGHT: 203 LBS | HEART RATE: 51 BPM | DIASTOLIC BLOOD PRESSURE: 55 MMHG | OXYGEN SATURATION: 94 % | BODY MASS INDEX: 29.06 KG/M2 | RESPIRATION RATE: 16 BRPM | TEMPERATURE: 98.7 F | HEIGHT: 70 IN | SYSTOLIC BLOOD PRESSURE: 93 MMHG

## 2023-11-15 DIAGNOSIS — H57.819 BROW PTOSIS: Primary | ICD-10-CM

## 2023-11-15 PROCEDURE — 370N000017 HC ANESTHESIA TECHNICAL FEE, PER MIN: Performed by: OPHTHALMOLOGY

## 2023-11-15 PROCEDURE — 258N000003 HC RX IP 258 OP 636

## 2023-11-15 PROCEDURE — 250N000009 HC RX 250

## 2023-11-15 PROCEDURE — 360N000076 HC SURGERY LEVEL 3, PER MIN: Performed by: OPHTHALMOLOGY

## 2023-11-15 PROCEDURE — 250N000013 HC RX MED GY IP 250 OP 250 PS 637

## 2023-11-15 PROCEDURE — 999N000141 HC STATISTIC PRE-PROCEDURE NURSING ASSESSMENT: Performed by: OPHTHALMOLOGY

## 2023-11-15 PROCEDURE — 710N000012 HC RECOVERY PHASE 2, PER MINUTE: Performed by: OPHTHALMOLOGY

## 2023-11-15 PROCEDURE — 250N000009 HC RX 250: Performed by: OPHTHALMOLOGY

## 2023-11-15 PROCEDURE — 250N000011 HC RX IP 250 OP 636: Performed by: NURSE ANESTHETIST, CERTIFIED REGISTERED

## 2023-11-15 PROCEDURE — 272N000001 HC OR GENERAL SUPPLY STERILE: Performed by: OPHTHALMOLOGY

## 2023-11-15 RX ORDER — BUPIVACAINE HYDROCHLORIDE AND EPINEPHRINE 5; 5 MG/ML; UG/ML
INJECTION, SOLUTION PERINEURAL PRN
Status: DISCONTINUED | OUTPATIENT
Start: 2023-11-15 | End: 2023-11-15 | Stop reason: HOSPADM

## 2023-11-15 RX ORDER — FENTANYL CITRATE 50 UG/ML
INJECTION, SOLUTION INTRAMUSCULAR; INTRAVENOUS PRN
Status: DISCONTINUED | OUTPATIENT
Start: 2023-11-15 | End: 2023-11-15

## 2023-11-15 RX ORDER — LIDOCAINE 40 MG/G
CREAM TOPICAL
Status: DISCONTINUED | OUTPATIENT
Start: 2023-11-15 | End: 2023-11-15 | Stop reason: HOSPADM

## 2023-11-15 RX ORDER — GABAPENTIN 100 MG/1
100 CAPSULE ORAL
Status: COMPLETED | OUTPATIENT
Start: 2023-11-15 | End: 2023-11-15

## 2023-11-15 RX ORDER — PROPOFOL 10 MG/ML
INJECTION, EMULSION INTRAVENOUS PRN
Status: DISCONTINUED | OUTPATIENT
Start: 2023-11-15 | End: 2023-11-15

## 2023-11-15 RX ORDER — ERYTHROMYCIN 5 MG/G
OINTMENT OPHTHALMIC PRN
Status: DISCONTINUED | OUTPATIENT
Start: 2023-11-15 | End: 2023-11-15 | Stop reason: HOSPADM

## 2023-11-15 RX ORDER — SODIUM CHLORIDE, SODIUM LACTATE, POTASSIUM CHLORIDE, CALCIUM CHLORIDE 600; 310; 30; 20 MG/100ML; MG/100ML; MG/100ML; MG/100ML
INJECTION, SOLUTION INTRAVENOUS CONTINUOUS
Status: DISCONTINUED | OUTPATIENT
Start: 2023-11-15 | End: 2023-11-15 | Stop reason: HOSPADM

## 2023-11-15 RX ORDER — SODIUM CHLORIDE, SODIUM LACTATE, POTASSIUM CHLORIDE, CALCIUM CHLORIDE 600; 310; 30; 20 MG/100ML; MG/100ML; MG/100ML; MG/100ML
INJECTION, SOLUTION INTRAVENOUS CONTINUOUS
Status: CANCELLED | OUTPATIENT
Start: 2023-11-15

## 2023-11-15 RX ORDER — ONDANSETRON 2 MG/ML
4 INJECTION INTRAMUSCULAR; INTRAVENOUS EVERY 30 MIN PRN
Status: CANCELLED | OUTPATIENT
Start: 2023-11-15

## 2023-11-15 RX ORDER — ONDANSETRON 4 MG/1
4 TABLET, ORALLY DISINTEGRATING ORAL EVERY 30 MIN PRN
Status: CANCELLED | OUTPATIENT
Start: 2023-11-15

## 2023-11-15 RX ORDER — ERYTHROMYCIN 5 MG/G
0.5 OINTMENT OPHTHALMIC 3 TIMES DAILY
Qty: 4 G | Refills: 1 | Status: SHIPPED | OUTPATIENT
Start: 2023-11-15

## 2023-11-15 RX ORDER — ACETAMINOPHEN 325 MG/1
975 TABLET ORAL ONCE
Status: COMPLETED | OUTPATIENT
Start: 2023-11-15 | End: 2023-11-15

## 2023-11-15 RX ORDER — ACETAMINOPHEN 500 MG
500-1000 TABLET ORAL EVERY 6 HOURS PRN
Qty: 20 TABLET | Refills: 0 | Status: SHIPPED | OUTPATIENT
Start: 2023-11-15

## 2023-11-15 RX ORDER — ALBUTEROL SULFATE 0.83 MG/ML
2.5 SOLUTION RESPIRATORY (INHALATION) EVERY 4 HOURS PRN
Status: CANCELLED | OUTPATIENT
Start: 2023-11-15

## 2023-11-15 RX ADMIN — SODIUM CHLORIDE, POTASSIUM CHLORIDE, SODIUM LACTATE AND CALCIUM CHLORIDE: 600; 310; 30; 20 INJECTION, SOLUTION INTRAVENOUS at 12:24

## 2023-11-15 RX ADMIN — PROPOFOL 10 MG: 10 INJECTION, EMULSION INTRAVENOUS at 14:40

## 2023-11-15 RX ADMIN — MIDAZOLAM 0.5 MG: 1 INJECTION INTRAMUSCULAR; INTRAVENOUS at 14:38

## 2023-11-15 RX ADMIN — GABAPENTIN 100 MG: 100 CAPSULE ORAL at 12:14

## 2023-11-15 RX ADMIN — FENTANYL CITRATE 50 MCG: 50 INJECTION INTRAMUSCULAR; INTRAVENOUS at 14:39

## 2023-11-15 RX ADMIN — LIDOCAINE HYDROCHLORIDE 0.1 ML: 10 INJECTION, SOLUTION EPIDURAL; INFILTRATION; INTRACAUDAL; PERINEURAL at 12:24

## 2023-11-15 RX ADMIN — ACETAMINOPHEN 975 MG: 325 TABLET, FILM COATED ORAL at 12:14

## 2023-11-15 ASSESSMENT — ACTIVITIES OF DAILY LIVING (ADL)
ADLS_ACUITY_SCORE: 35
ADLS_ACUITY_SCORE: 35

## 2023-11-15 NOTE — OP NOTE
Pre operative Diagnosis:  1. Bilateral visually significant brow ptosis     Post-operative Diagnosis: Same as above      Procedure(s):    1. Bilateral brow ptosis repair      Surgeon: Ruth Hatch MD      Anesthesia: Monitored anesthesia care with local anesthetic      Blood loss: <5 cc      Complications: None      Specimens: None     Operative Procedure:  In the pre operative area, the planned procedure was discussed with the patient.  The patient was brought to the operating room and a timeout was performed.  The bilateral browplasty incisions medial and lateral were marked with care taken to avoid post operative lagophthalmos or retraction.  Monitors were placed for monitored anesthesia care.  Monitored anesthesia care was induced.  A time out was performed and local anesthetic was injected. The patient was prepped and draped in sterile fashion.      First, attention was directed to the browplasty markings.  A #15 blade followed by Monopolar cautery was used to excise the tissue in a subcutaneous plane.  Hemostasis was achieved with cautery.  The deep tissue was closed with deep, buried, interrupted 4-0 vicryl sutures and the skin edges were closed with 5-0 fast absorbing gut suture in an interrupted and running fashion with care taken to estela the skin edges.  The same procedure was performed on the side.  Medial and lateral incisions were performed.      The patient preferred to not have a blepharoplasty at the same time.       At the conclusion of the case, the eyelids appeared to be in good position. The patient's face was washed with wet and then dry gauze and antibiotic ointment was placed and the patient transferred to recovery in stable condition.      Ruth Hatch MD

## 2023-11-15 NOTE — INTERVAL H&P NOTE
"I have reviewed the surgical (or preoperative) H&P that is linked to this encounter, and examined the patient. There are no significant changes    Clinical Conditions Present on Arrival:  Clinically Significant Risk Factors Present on Admission                 # Drug Induced Platelet Defect: home medication list includes an antiplatelet medication  # Overweight: Estimated body mass index is 29.13 kg/m  as calculated from the following:    Height as of this encounter: 1.778 m (5' 10\").    Weight as of this encounter: 92.1 kg (203 lb).       "

## 2023-11-15 NOTE — ANESTHESIA CARE TRANSFER NOTE
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    Anesthesia Type:   MAC     Note:    Oropharynx: oropharynx clear of all foreign objects and spontaneously breathing  Level of Consciousness: awake  Oxygen Supplementation: room air    Independent Airway: airway patency satisfactory and stable  Dentition: dentition unchanged  Vital Signs Stable: post-procedure vital signs reviewed and stable  Report to RN Given: handoff report given  Patient transferred to: Phase II    Handoff Report: Identifed the Patient, Identified the Reponsible Provider, Reviewed the pertinent medical history, Discussed the surgical course, Reviewed Intra-OP anesthesia mangement and issues during anesthesia, Set expectations for post-procedure period and Allowed opportunity for questions and acknowledgement of understanding  Vitals:  Vitals Value Taken Time   BP     Temp     Pulse     Resp     SpO2 93 % 11/15/23 1530   Vitals shown include unfiled device data.    Electronically Signed By: ABE Hollis CRNA  November 15, 2023  3:31 PM

## 2023-11-15 NOTE — DISCHARGE INSTRUCTIONS
Same Day Surgery Discharge Instructions  Special Precautions After Surgery - Adult    It is not unusual to feel lightheaded or faint, up to 24 hours after surgery or while taking pain medication.  If you have these symptoms; sit for a few minutes before standing and have someone assist you when getting up.  You should rest and relax for the next 24 hours and must have someone stay with you for at least 24 hours after your discharge.  DO NOT DRIVE any vehicle or operate mechanical equipment for 24 hours following the end of your surgery.  DO NOT DRIVE while taking narcotic pain medications that have been prescribed by your physician.  If you had a limb operated on, you must be able to use it fully to drive.  DO NOT drink alcoholic beverages for 24 hours following surgery or while taking prescription pain medication.  Drink clear liquids (apple juice, ginger ale, broth, 7-Up, etc.).  Progress to your regular diet as you feel able.  Any questions call your physician and do not make important decisions for 24 hours.    Medications:  Acetaminophen (Tylenol):  Next dose: 12:14 pm.  Follow the instructions on the bottle.     __________________________________________________________________________________________________________________________________  IMPORTANT NUMBERS:    [facility] [place]:  [phone], [phone]  Pharmacy:  [phone]  Same Day Surgery:  [phone], Monday - Friday until 8:30 p.m.  Urgent Care:  [phone]  Emergency Room:  [phone]      [facility]:  [phone]                                                                             [facility]:  [phone] option 1  [facility]:  [phone]     [facility]:  [phone]   [facility]:  [phone]   Home Medical Equipment: [phone]  [facility]:  [phone]

## 2023-11-15 NOTE — ANESTHESIA POSTPROCEDURE EVALUATION
Patient: Vannesa Alvarez    Procedure: Procedure(s):  Bilateral direct browplasty       Anesthesia Type:  MAC    Note:  Disposition: Outpatient   Postop Pain Control: Uneventful            Sign Out: Well controlled pain   PONV: No   Neuro/Psych: Uneventful            Sign Out: Acceptable/Baseline neuro status   Airway/Respiratory: Uneventful            Sign Out: Acceptable/Baseline resp. status   CV/Hemodynamics: Uneventful            Sign Out: Acceptable CV status; No obvious hypovolemia; No obvious fluid overload   Other NRE: NONE   DID A NON-ROUTINE EVENT OCCUR? No           Last vitals:  Vitals Value Taken Time   /54 11/15/23 1534   Temp     Pulse 63 11/15/23 1534   Resp     SpO2 92 % 11/15/23 1545   Vitals shown include unfiled device data.    Electronically Signed By: ABE Hollis CRNA  November 15, 2023  3:46 PM

## 2024-06-08 ENCOUNTER — HEALTH MAINTENANCE LETTER (OUTPATIENT)
Age: 89
End: 2024-06-08

## 2024-08-25 ENCOUNTER — HOSPITAL ENCOUNTER (EMERGENCY)
Facility: CLINIC | Age: 89
Discharge: HOME | End: 2024-08-25
Attending: EMERGENCY MEDICINE | Admitting: EMERGENCY MEDICINE
Payer: MEDICARE

## 2024-08-25 VITALS
HEART RATE: 65 BPM | TEMPERATURE: 97.7 F | SYSTOLIC BLOOD PRESSURE: 143 MMHG | DIASTOLIC BLOOD PRESSURE: 84 MMHG | OXYGEN SATURATION: 100 % | WEIGHT: 207 LBS | RESPIRATION RATE: 18 BRPM | BODY MASS INDEX: 29.7 KG/M2

## 2024-08-25 DIAGNOSIS — R31.0 GROSS HEMATURIA: ICD-10-CM

## 2024-08-25 DIAGNOSIS — N30.01 ACUTE CYSTITIS WITH HEMATURIA: ICD-10-CM

## 2024-08-25 LAB
ALBUMIN UR-MCNC: 100 MG/DL
ANION GAP SERPL CALCULATED.3IONS-SCNC: 10 MMOL/L (ref 7–15)
APPEARANCE UR: ABNORMAL
BASOPHILS # BLD AUTO: 0 10E3/UL (ref 0–0.2)
BASOPHILS NFR BLD AUTO: 1 %
BILIRUB UR QL STRIP: NEGATIVE
BUN SERPL-MCNC: 18.1 MG/DL (ref 8–23)
CALCIUM SERPL-MCNC: 9.2 MG/DL (ref 8.8–10.4)
CHLORIDE SERPL-SCNC: 99 MMOL/L (ref 98–107)
COLOR UR AUTO: YELLOW
CREAT SERPL-MCNC: 1.05 MG/DL (ref 0.67–1.17)
EGFRCR SERPLBLD CKD-EPI 2021: 66 ML/MIN/1.73M2
EOSINOPHIL # BLD AUTO: 0.2 10E3/UL (ref 0–0.7)
EOSINOPHIL NFR BLD AUTO: 3 %
ERYTHROCYTE [DISTWIDTH] IN BLOOD BY AUTOMATED COUNT: 13.5 % (ref 10–15)
GLUCOSE SERPL-MCNC: 103 MG/DL (ref 70–99)
GLUCOSE UR STRIP-MCNC: NEGATIVE MG/DL
HCO3 SERPL-SCNC: 27 MMOL/L (ref 22–29)
HCT VFR BLD AUTO: 37.2 % (ref 40–53)
HGB BLD-MCNC: 12.8 G/DL (ref 13.3–17.7)
HGB UR QL STRIP: ABNORMAL
IMM GRANULOCYTES # BLD: 0.1 10E3/UL
IMM GRANULOCYTES NFR BLD: 1 %
INR PPP: 1.02 (ref 0.85–1.15)
KETONES UR STRIP-MCNC: NEGATIVE MG/DL
LEUKOCYTE ESTERASE UR QL STRIP: NEGATIVE
LYMPHOCYTES # BLD AUTO: 1.7 10E3/UL (ref 0.8–5.3)
LYMPHOCYTES NFR BLD AUTO: 24 %
MCH RBC QN AUTO: 33.8 PG (ref 26.5–33)
MCHC RBC AUTO-ENTMCNC: 34.4 G/DL (ref 31.5–36.5)
MCV RBC AUTO: 98 FL (ref 78–100)
MONOCYTES # BLD AUTO: 0.6 10E3/UL (ref 0–1.3)
MONOCYTES NFR BLD AUTO: 8 %
NEUTROPHILS # BLD AUTO: 4.6 10E3/UL (ref 1.6–8.3)
NEUTROPHILS NFR BLD AUTO: 64 %
NITRATE UR QL: NEGATIVE
NRBC # BLD AUTO: 0 10E3/UL
NRBC BLD AUTO-RTO: 0 /100
PH UR STRIP: 6 [PH] (ref 5–7)
PLATELET # BLD AUTO: 139 10E3/UL (ref 150–450)
POTASSIUM SERPL-SCNC: 3.5 MMOL/L (ref 3.4–5.3)
RBC # BLD AUTO: 3.79 10E6/UL (ref 4.4–5.9)
RBC URINE: >182 /HPF
SODIUM SERPL-SCNC: 136 MMOL/L (ref 135–145)
SP GR UR STRIP: 1.01 (ref 1–1.03)
UROBILINOGEN UR STRIP-MCNC: NORMAL MG/DL
WBC # BLD AUTO: 7.1 10E3/UL (ref 4–11)
WBC URINE: >182 /HPF

## 2024-08-25 PROCEDURE — 87086 URINE CULTURE/COLONY COUNT: CPT | Performed by: EMERGENCY MEDICINE

## 2024-08-25 PROCEDURE — 81003 URINALYSIS AUTO W/O SCOPE: CPT | Performed by: EMERGENCY MEDICINE

## 2024-08-25 PROCEDURE — 36415 COLL VENOUS BLD VENIPUNCTURE: CPT | Performed by: EMERGENCY MEDICINE

## 2024-08-25 PROCEDURE — 85610 PROTHROMBIN TIME: CPT | Performed by: EMERGENCY MEDICINE

## 2024-08-25 PROCEDURE — 99284 EMERGENCY DEPT VISIT MOD MDM: CPT | Performed by: EMERGENCY MEDICINE

## 2024-08-25 PROCEDURE — 85025 COMPLETE CBC W/AUTO DIFF WBC: CPT | Performed by: EMERGENCY MEDICINE

## 2024-08-25 PROCEDURE — 80048 BASIC METABOLIC PNL TOTAL CA: CPT | Performed by: EMERGENCY MEDICINE

## 2024-08-25 PROCEDURE — 250N000013 HC RX MED GY IP 250 OP 250 PS 637: Performed by: EMERGENCY MEDICINE

## 2024-08-25 PROCEDURE — 99283 EMERGENCY DEPT VISIT LOW MDM: CPT | Performed by: EMERGENCY MEDICINE

## 2024-08-25 RX ORDER — CEPHALEXIN 500 MG/1
500 CAPSULE ORAL ONCE
Status: COMPLETED | OUTPATIENT
Start: 2024-08-25 | End: 2024-08-25

## 2024-08-25 RX ORDER — CEPHALEXIN 500 MG/1
500 CAPSULE ORAL 3 TIMES DAILY
Qty: 15 CAPSULE | Refills: 0 | Status: SHIPPED | OUTPATIENT
Start: 2024-08-25 | End: 2024-08-30

## 2024-08-25 RX ADMIN — CEPHALEXIN 500 MG: 500 CAPSULE ORAL at 06:56

## 2024-08-25 ASSESSMENT — ENCOUNTER SYMPTOMS
FLANK PAIN: 0
DIARRHEA: 0
BRUISES/BLEEDS EASILY: 0
HEMATURIA: 1
VOMITING: 0
SHORTNESS OF BREATH: 0
BLOOD IN STOOL: 0
NAUSEA: 0
APPETITE CHANGE: 0
ABDOMINAL PAIN: 0
DYSURIA: 0
LIGHT-HEADEDNESS: 0
FREQUENCY: 0
BACK PAIN: 0
CHEST TIGHTNESS: 0
DIFFICULTY URINATING: 0
FEVER: 0
COUGH: 0

## 2024-08-25 ASSESSMENT — ACTIVITIES OF DAILY LIVING (ADL): ADLS_ACUITY_SCORE: 36

## 2024-08-25 ASSESSMENT — COLUMBIA-SUICIDE SEVERITY RATING SCALE - C-SSRS
6. HAVE YOU EVER DONE ANYTHING, STARTED TO DO ANYTHING, OR PREPARED TO DO ANYTHING TO END YOUR LIFE?: NO
2. HAVE YOU ACTUALLY HAD ANY THOUGHTS OF KILLING YOURSELF IN THE PAST MONTH?: NO
1. IN THE PAST MONTH, HAVE YOU WISHED YOU WERE DEAD OR WISHED YOU COULD GO TO SLEEP AND NOT WAKE UP?: NO

## 2024-08-25 NOTE — ED TRIAGE NOTES
Pt presents with concerns of blood in urine that he notice for first time this am when he awoke to urinate. Pt states he is not on a blood thinner.     Triage Assessment (Adult)       Row Name 08/25/24 0510          Triage Assessment    Airway WDL WDL        Respiratory WDL    Respiratory WDL WDL        Skin Circulation/Temperature WDL    Skin Circulation/Temperature WDL WDL        Cardiac WDL    Cardiac WDL WDL        Peripheral/Neurovascular WDL    Peripheral Neurovascular WDL WDL        Cognitive/Neuro/Behavioral WDL    Cognitive/Neuro/Behavioral WDL WDL

## 2024-08-25 NOTE — DISCHARGE INSTRUCTIONS
Take cephalexin as prescribed.  A urine culture is pending and should be resulted in 1 to 2 days.  This may necessitate a change of antibiotic coverage.  It is very important that you follow-up with your primary care provider.  Will need to ensure resolution of blood in urine.  May need follow-up with urology.    You should return to the emergency department if you develop severe nausea and vomiting and are unable to keep liquids down, if you develop severe back/flank or stomach pain, or if your symptoms are not clearly improving at home.

## 2024-08-25 NOTE — ED PROVIDER NOTES
History     Chief Complaint   Patient presents with    Hematuria     HPI  Vannesa Alvarez is a 89 year old male with a history of hypertension, previous prostate cancer (TURP in 2004), and parkinsonism presenting for evaluation of gross hematuria this morning.  Patient reports he had a couple spots of blood as well as some orange tinted urine recently.  This morning woke up to urinate and had much darker red urine.  Patient denies any dysuria, urgency, or frequency.  Denies any clots in the urine.  Denies any abdominal or flank pain.  Denies recent trauma.    Allergies:  Allergies   Allergen Reactions    Latex Itching    Cipro [Ciprofloxacin] Itching and Rash       Problem List:    Patient Active Problem List    Diagnosis Date Noted    Primary parkinsonism (H) 03/04/2022     Priority: Medium     Formatting of this note might be different from the original.  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     Priority: Medium    Hemorrhoids 12/08/2006     Priority: Medium        Past Medical History:    Past Medical History:   Diagnosis Date    Cancer (H) 5/2014    Gastro-oesophageal reflux disease     Syncope        Past Surgical History:    Past Surgical History:   Procedure Laterality Date    APPENDECTOMY  1942    CHOLECYSTECTOMY      REPAIR PTOSIS BILATERAL Bilateral 11/15/2023    Procedure: Bilateral direct browplasty;  Surgeon: Ruth Hatch MD;  Location: [facility] OR       Family History:    Family History   Problem Relation Age of Onset    Hypertension Father        Social History:  Marital Status:    [5]  Social History     Tobacco Use    Smoking status: Former     Current packs/day: 0.00     Types: Cigarettes     Quit date: 1969     Years since quittin.6    Smokeless tobacco: Never        Medications:    acetaminophen (TYLENOL) 325 MG tablet  acetaminophen (TYLENOL) 500 MG tablet  ALLOPURINOL 300 MG OR TABS  aspirin 81 MG chewable tablet  azelastine (ASTELIN) 0.1 % nasal spray  carbidopa-levodopa (SINEMET)  MG tablet  clotrimazole (LOTRIMIN) 1 % external cream  cyanocobalamin 1000 MCG/ML injection  erythromycin (ROMYCIN) 5 MG/GM ophthalmic ointment  fluorouracil (EFUDEX) 5 % external cream  indapamide (LOZOL) 2.5 MG tablet  loperamide (IMODIUM A-D) 2 MG tablet  metoprolol succinate ER (TOPROL XL) 50 MG 24 hr tablet  nystatin (MYCOSTATIN) cream  potassium chloride SA (K-DUR/KLOR-CON M) 10 MEQ CR tablet  propranolol ER (INDERAL LA) 80 MG 24 hr capsule  triamcinolone (KENALOG) 0.1 % external cream          Review of Systems   Constitutional:  Negative for appetite change and fever.   HENT:  Negative for congestion.    Respiratory:  Negative for cough, chest tightness and shortness of breath.    Cardiovascular:  Negative for chest pain.   Gastrointestinal:  Negative for abdominal pain, blood in stool, diarrhea, nausea and vomiting.   Genitourinary:  Positive for hematuria. Negative for decreased urine volume, difficulty urinating, dysuria, flank pain, frequency, penile pain and urgency.   Musculoskeletal:  Negative for back pain.   Skin:  Negative for rash.   Neurological:  Negative for light-headedness.   Hematological:  Does not bruise/bleed easily.   All other systems reviewed and are negative.      Physical Exam   BP: (!) 154/90  Pulse: 68  Temp: 97.7  F (36.5  C)  Resp: 18  Weight: 93.9 kg (207 lb)  SpO2: 95 %      Physical Exam  Vitals and nursing note reviewed.   Constitutional:       Appearance: Normal appearance. He is not ill-appearing or diaphoretic.   HENT:      Head: Atraumatic.       Nose: Nose normal.      Mouth/Throat:      Mouth: Mucous membranes are moist.   Eyes:      Conjunctiva/sclera: Conjunctivae normal.   Cardiovascular:      Rate and Rhythm: Normal rate.      Pulses: Normal pulses.   Pulmonary:      Effort: Pulmonary effort is normal.   Abdominal:      Palpations: Abdomen is soft.      Tenderness: There is no abdominal tenderness. There is no right CVA tenderness, left CVA tenderness or guarding.   Musculoskeletal:         General: Normal range of motion.   Skin:     General: Skin is warm and dry.      Capillary Refill: Capillary refill takes less than 2 seconds.   Neurological:      Mental Status: He is alert and oriented to person, place, and time.   Psychiatric:         Mood and Affect: Mood normal.         ED Course        Procedures                  Results for orders placed or performed during the hospital encounter of 08/25/24 (from the past 24 hour(s))   CBC with platelets differential    Narrative    The following orders were created for panel order CBC with platelets differential.  Procedure                               Abnormality         Status                     ---------                               -----------         ------                     CBC with platelets and d...[[id number]]  Abnormal            Final result                 Please view results for these tests on the individual orders.   CBC with platelets and differential   Result Value Ref Range    WBC Count 7.1 4.0 - 11.0 10e3/uL    RBC Count 3.79 (L) 4.40 - 5.90 10e6/uL    Hemoglobin 12.8 (L) 13.3 - 17.7 g/dL    Hematocrit 37.2 (L) 40.0 - 53.0 %    MCV 98 78 - 100 fL    MCH 33.8 (H) 26.5 - 33.0 pg    MCHC 34.4 31.5 - 36.5 g/dL    RDW 13.5 10.0 - 15.0 %    Platelet Count 139 (L) 150 - 450 10e3/uL    % Neutrophils 64 %    % Lymphocytes 24 %    % Monocytes 8 %    % Eosinophils 3 %    % Basophils 1 %    % Immature Granulocytes 1 %    NRBCs per 100 WBC 0 <1 /100    Absolute Neutrophils 4.6 1.6 - 8.3  10e3/uL    Absolute Lymphocytes 1.7 0.8 - 5.3 10e3/uL    Absolute Monocytes 0.6 0.0 - 1.3 10e3/uL    Absolute Eosinophils 0.2 0.0 - 0.7 10e3/uL    Absolute Basophils 0.0 0.0 - 0.2 10e3/uL    Absolute Immature Granulocytes 0.1 <=0.4 10e3/uL    Absolute NRBCs 0.0 10e3/uL       Medications - No data to display    6:03 AM: Signed out to Dr Walker pending UA and disposition.    Assessments & Plan (with Medical Decision Making)  89-year-old male presenting for evaluation of acute gross hematuria.  Patient reports having some orange tinted urine recently and tonight with gross hematuria.  No pain or discomfort.  Not passing any clots.  No fevers or chills.  Patient has a history of prostate cancer.  Not currently having any treatment but undergoing routine surveillance.     I have reviewed the nursing notes.    I have reviewed the findings, diagnosis, plan and need for follow up with the patient.          New Prescriptions    No medications on file       Final diagnoses:   Gross hematuria       8/25/2024   [facility] EMERGENCY DEPT       Montesinos, Leon Hankins MD  08/25/24 0607

## 2024-08-25 NOTE — ED PROVIDER NOTES
Signout received at change of shift.  Please see prior provider note for details.    In brief, 89-year-old male presenting with painless hematuria.  No passage of clots.  No fevers or chills.  History of prostate cancer status post TURP.  Plan to follow-up on urinalysis and INR.  BMP grossly normal, glucose 106.  CBC with mild anemia with hemoglobin of 12.8 this is down from the 14 range but this was 6 years ago.  No leukocytosis.  Platelets slightly low at 139.  Patient not anticoagulated.    INR 1.02.  Urinalysis suggestive of infection w/ >182 WBC. Denies dysuria or urgency to me but reports urinary frequency. Urine culture pending. Rx for Cephalexin sent to preferred pharmacy. Initial dose given in ED. Stressed need for PCP f/u and further evaluation of hematuria and possible need for Urology and cystoscopy if doesn't clear with treatment.         Santino Christiansen MD, MD  08/25/24 0726

## 2024-08-26 LAB — BACTERIA UR CULT: NORMAL

## 2024-09-06 ENCOUNTER — OFFICE VISIT (OUTPATIENT)
Dept: URGENT CARE | Facility: URGENT CARE | Age: 89
End: 2024-09-06
Payer: MEDICARE

## 2024-09-06 VITALS
BODY MASS INDEX: 27.98 KG/M2 | SYSTOLIC BLOOD PRESSURE: 122 MMHG | WEIGHT: 195 LBS | TEMPERATURE: 98.4 F | HEART RATE: 74 BPM | RESPIRATION RATE: 18 BRPM | DIASTOLIC BLOOD PRESSURE: 73 MMHG | OXYGEN SATURATION: 94 %

## 2024-09-06 DIAGNOSIS — R05.1 ACUTE COUGH: ICD-10-CM

## 2024-09-06 DIAGNOSIS — U07.1 INFECTION DUE TO 2019 NOVEL CORONAVIRUS: Primary | ICD-10-CM

## 2024-09-06 PROCEDURE — 99203 OFFICE O/P NEW LOW 30 MIN: CPT | Performed by: PHYSICIAN ASSISTANT

## 2024-09-06 RX ORDER — BENZONATATE 200 MG/1
200 CAPSULE ORAL 3 TIMES DAILY PRN
Qty: 30 CAPSULE | Refills: 0 | Status: SHIPPED | OUTPATIENT
Start: 2024-09-06

## 2024-09-06 RX ORDER — ALBUTEROL SULFATE 90 UG/1
AEROSOL, METERED RESPIRATORY (INHALATION)
Qty: 18 G | Refills: 0 | Status: SHIPPED | OUTPATIENT
Start: 2024-09-06

## 2024-09-06 NOTE — PROGRESS NOTES
"  Assessment & Plan     Infection due to 2019 novel coronavirus  Will treat with paxlovid. Continue with supportive care. Get plenty of rest and push fluids. Return to clinic if symptoms worsen or do not improve; otherwise follow up as needed       - nirmatrelvir and ritonavir (PAXLOVID) 300 mg/100 mg therapy pack; Take 3 tablets by mouth 2 times daily for 5 days.    Acute cough    - benzonatate (TESSALON) 200 MG capsule; Take 1 capsule (200 mg) by mouth 3 times daily as needed for cough.  - albuterol (PROAIR HFA/PROVENTIL HFA/VENTOLIN HFA) 108 (90 Base) MCG/ACT inhaler; Inhale 2 puffs every 4-6 hours as needed for cough, wheezing, or shortness of breath          BMI  Estimated body mass index is 27.98 kg/m  as calculated from the following:    Height as of 11/15/23: 1.778 m (5' 10\").    Weight as of this encounter: 88.5 kg (195 lb).             Return in about 1 week (around 9/13/2024), or if symptoms worsen or fail to improve.            Subjective   Chief Complaint   Patient presents with    Covid Concern     Tested positive for COVID, would like paxlovid. Daughter says bad cough started last night. Was on antibiotic last week for bladder infection.       HPI     URI Adult    Onset of symptoms was 1 day(s) ago.  Course of illness is same.    Severity moderate  Current and Associated symptoms: cough, headache  Treatment measures tried include none  Predisposing factors include positive COVID test.    Estimated Creatinine Clearance: 49.2 mL/min (based on SCr of 1.05 mg/dL).                       Objective    /73   Pulse 74   Temp 98.4  F (36.9  C) (Tympanic)   Resp 18   Wt 88.5 kg (195 lb)   SpO2 94%   BMI 27.98 kg/m    Body mass index is 27.98 kg/m .      Physical Exam  Constitutional:       General: He is not in acute distress.     Appearance: He is well-developed.   HENT:      Head: Normocephalic and atraumatic.      Right Ear: Tympanic membrane and ear canal normal.      Left Ear: Tympanic membrane " and ear canal normal.   Eyes:      Conjunctiva/sclera: Conjunctivae normal.   Cardiovascular:      Rate and Rhythm: Normal rate and regular rhythm.   Pulmonary:      Effort: Pulmonary effort is normal.      Breath sounds: Normal breath sounds.   Skin:     General: Skin is warm and dry.      Findings: No rash.   Psychiatric:         Behavior: Behavior normal.                    Signed Electronically by: Tila Rose PA-C

## 2024-12-23 ENCOUNTER — HOSPITAL ENCOUNTER (OUTPATIENT)
Dept: CT IMAGING | Facility: HOSPITAL | Age: 89
Discharge: HOME | End: 2024-12-23
Attending: UROLOGY | Admitting: UROLOGY
Payer: MEDICARE

## 2024-12-23 DIAGNOSIS — R31.0 GROSS HEMATURIA: ICD-10-CM

## 2024-12-23 LAB
CREAT BLD-MCNC: 1.1 MG/DL (ref 0.7–1.3)
EGFRCR SERPLBLD CKD-EPI 2021: >60 ML/MIN/1.73M2

## 2024-12-23 PROCEDURE — 250N000009 HC RX 250

## 2024-12-23 PROCEDURE — 74178 CT ABD&PLV WO CNTR FLWD CNTR: CPT

## 2024-12-23 PROCEDURE — 250N000011 HC RX IP 250 OP 636

## 2024-12-23 PROCEDURE — 82565 ASSAY OF CREATININE: CPT

## 2024-12-23 RX ORDER — IOPAMIDOL 755 MG/ML
90 INJECTION, SOLUTION INTRAVASCULAR ONCE
Status: COMPLETED | OUTPATIENT
Start: 2024-12-23 | End: 2024-12-23

## 2024-12-23 RX ADMIN — SODIUM CHLORIDE 95 ML: 9 INJECTION, SOLUTION INTRAVENOUS at 13:15

## 2024-12-23 RX ADMIN — IOPAMIDOL 90 ML: 755 INJECTION, SOLUTION INTRAVENOUS at 13:14

## 2024-12-27 ENCOUNTER — APPOINTMENT (OUTPATIENT)
Dept: CT IMAGING | Facility: CLINIC | Age: 89
End: 2024-12-27
Attending: FAMILY MEDICINE
Payer: MEDICARE

## 2024-12-27 ENCOUNTER — APPOINTMENT (OUTPATIENT)
Dept: GENERAL RADIOLOGY | Facility: CLINIC | Age: 89
End: 2024-12-27
Attending: FAMILY MEDICINE
Payer: MEDICARE

## 2024-12-27 ENCOUNTER — HOSPITAL ENCOUNTER (EMERGENCY)
Facility: CLINIC | Age: 89
Discharge: HOME | End: 2024-12-27
Attending: FAMILY MEDICINE | Admitting: FAMILY MEDICINE
Payer: MEDICARE

## 2024-12-27 VITALS
HEART RATE: 60 BPM | DIASTOLIC BLOOD PRESSURE: 62 MMHG | OXYGEN SATURATION: 96 % | RESPIRATION RATE: 16 BRPM | TEMPERATURE: 97.5 F | SYSTOLIC BLOOD PRESSURE: 110 MMHG

## 2024-12-27 DIAGNOSIS — N30.00 ACUTE CYSTITIS WITHOUT HEMATURIA: ICD-10-CM

## 2024-12-27 DIAGNOSIS — R29.6 MULTIPLE FALLS: ICD-10-CM

## 2024-12-27 LAB
ALBUMIN UR-MCNC: 10 MG/DL
ANION GAP SERPL CALCULATED.3IONS-SCNC: 9 MMOL/L (ref 7–15)
APPEARANCE UR: ABNORMAL
BACTERIA #/AREA URNS HPF: ABNORMAL /HPF
BASOPHILS # BLD AUTO: 0 10E3/UL (ref 0–0.2)
BASOPHILS NFR BLD AUTO: 0 %
BILIRUB UR QL STRIP: NEGATIVE
BUN SERPL-MCNC: 21.7 MG/DL (ref 8–23)
CALCIUM SERPL-MCNC: 9.3 MG/DL (ref 8.8–10.4)
CHLORIDE SERPL-SCNC: 100 MMOL/L (ref 98–107)
COLOR UR AUTO: YELLOW
CREAT SERPL-MCNC: 1.03 MG/DL (ref 0.67–1.17)
EGFRCR SERPLBLD CKD-EPI 2021: 67 ML/MIN/1.73M2
EOSINOPHIL # BLD AUTO: 0.1 10E3/UL (ref 0–0.7)
EOSINOPHIL NFR BLD AUTO: 2 %
ERYTHROCYTE [DISTWIDTH] IN BLOOD BY AUTOMATED COUNT: 13.7 % (ref 10–15)
GLUCOSE SERPL-MCNC: 108 MG/DL (ref 70–99)
GLUCOSE UR STRIP-MCNC: NEGATIVE MG/DL
HCO3 SERPL-SCNC: 31 MMOL/L (ref 22–29)
HCT VFR BLD AUTO: 34.5 % (ref 40–53)
HGB BLD-MCNC: 11.4 G/DL (ref 13.3–17.7)
HGB UR QL STRIP: ABNORMAL
IMM GRANULOCYTES # BLD: 0.1 10E3/UL
IMM GRANULOCYTES NFR BLD: 1 %
KETONES UR STRIP-MCNC: NEGATIVE MG/DL
LEUKOCYTE ESTERASE UR QL STRIP: ABNORMAL
LYMPHOCYTES # BLD AUTO: 1.2 10E3/UL (ref 0.8–5.3)
LYMPHOCYTES NFR BLD AUTO: 16 %
MCH RBC QN AUTO: 32.9 PG (ref 26.5–33)
MCHC RBC AUTO-ENTMCNC: 33 G/DL (ref 31.5–36.5)
MCV RBC AUTO: 99 FL (ref 78–100)
MONOCYTES # BLD AUTO: 0.7 10E3/UL (ref 0–1.3)
MONOCYTES NFR BLD AUTO: 9 %
NEUTROPHILS # BLD AUTO: 5.4 10E3/UL (ref 1.6–8.3)
NEUTROPHILS NFR BLD AUTO: 72 %
NITRATE UR QL: NEGATIVE
NRBC # BLD AUTO: 0 10E3/UL
NRBC BLD AUTO-RTO: 0 /100
PH UR STRIP: 6.5 [PH] (ref 5–7)
PLATELET # BLD AUTO: 137 10E3/UL (ref 150–450)
POTASSIUM SERPL-SCNC: 3.6 MMOL/L (ref 3.4–5.3)
RBC # BLD AUTO: 3.47 10E6/UL (ref 4.4–5.9)
RBC URINE: 24 /HPF
SODIUM SERPL-SCNC: 140 MMOL/L (ref 135–145)
SP GR UR STRIP: 1.01 (ref 1–1.03)
SQUAMOUS EPITHELIAL: <1 /HPF
UROBILINOGEN UR STRIP-MCNC: NORMAL MG/DL
WBC # BLD AUTO: 7.6 10E3/UL (ref 4–11)
WBC URINE: >182 /HPF

## 2024-12-27 PROCEDURE — G1010 CDSM STANSON: HCPCS

## 2024-12-27 PROCEDURE — 36415 COLL VENOUS BLD VENIPUNCTURE: CPT | Performed by: FAMILY MEDICINE

## 2024-12-27 PROCEDURE — 87086 URINE CULTURE/COLONY COUNT: CPT | Performed by: FAMILY MEDICINE

## 2024-12-27 PROCEDURE — 85004 AUTOMATED DIFF WBC COUNT: CPT | Performed by: FAMILY MEDICINE

## 2024-12-27 PROCEDURE — 82374 ASSAY BLOOD CARBON DIOXIDE: CPT | Performed by: FAMILY MEDICINE

## 2024-12-27 PROCEDURE — 99284 EMERGENCY DEPT VISIT MOD MDM: CPT | Performed by: FAMILY MEDICINE

## 2024-12-27 PROCEDURE — 99284 EMERGENCY DEPT VISIT MOD MDM: CPT | Mod: 25

## 2024-12-27 PROCEDURE — 71046 X-RAY EXAM CHEST 2 VIEWS: CPT

## 2024-12-27 PROCEDURE — 82565 ASSAY OF CREATININE: CPT | Performed by: FAMILY MEDICINE

## 2024-12-27 PROCEDURE — 85048 AUTOMATED LEUKOCYTE COUNT: CPT | Performed by: FAMILY MEDICINE

## 2024-12-27 PROCEDURE — 80048 BASIC METABOLIC PNL TOTAL CA: CPT | Performed by: FAMILY MEDICINE

## 2024-12-27 PROCEDURE — 81001 URINALYSIS AUTO W/SCOPE: CPT | Performed by: FAMILY MEDICINE

## 2024-12-27 PROCEDURE — 72125 CT NECK SPINE W/O DYE: CPT | Mod: ME

## 2024-12-27 RX ORDER — CEPHALEXIN 500 MG/1
500 CAPSULE ORAL 2 TIMES DAILY
Qty: 14 CAPSULE | Refills: 0 | Status: SHIPPED | OUTPATIENT
Start: 2024-12-27 | End: 2025-01-03

## 2024-12-27 ASSESSMENT — ACTIVITIES OF DAILY LIVING (ADL)
ADLS_ACUITY_SCORE: 42

## 2024-12-27 NOTE — DISCHARGE INSTRUCTIONS
RETURN TO THE EMERGENCY ROOM FOR THE FOLLOWING:    Severely worsened weakness and falling, fever greater than 101, concerning changes in behavior/confusion, or at anytime for any concern.    FOLLOW UP:    With your primary clinic only as needed.  Return here if not improved over the next 3 days.    TREATMENT RECOMMENDATIONS:    Keflex 500 mg twice a day for 7 days.    NURSE ADVICE LINE:  [phone] or [phone]

## 2024-12-27 NOTE — ED TRIAGE NOTES
Pt states he fell this morning at about 9:20. He leaned over to  a pill that he had dropped on the floor and fell to the side hitting the back of his head. Denies blood thinners.      Triage Assessment (Adult)       Row Name 12/27/24 1051          Triage Assessment    Airway WDL WDL        Respiratory WDL    Respiratory WDL WDL        Skin Circulation/Temperature WDL    Skin Circulation/Temperature WDL WDL        Cardiac WDL    Cardiac WDL WDL        Peripheral/Neurovascular WDL    Peripheral Neurovascular WDL WDL        Cognitive/Neuro/Behavioral WDL    Cognitive/Neuro/Behavioral WDL WDL                     
No

## 2024-12-27 NOTE — ED PROVIDER NOTES
"  HPI   Patient is a 89-year-old male presenting with his son by private car for multiple falls.  Per my chart review, the patient has parkinsonism and gastric cancer.  He has a history of pneumonia and syncope.  He does not take medication for anticoagulation.  No recent alcohol.    Yesterday evening the patient fell without obvious cause.  No injury during that episode.  Today, the patient was up and reaching over to get a pill off the ground when he fell again.  He describes falling to his back side and hitting the posterior left head.  He denies loss of consciousness.  He does have headache.  He does have neck pain.  No nausea or vomiting.  He is acting appropriately per his son.  He did not get up on his own but required help.  He denies recent fever.  He has been coughing over the past 2 weeks.  No chest pain.  No palpitations or lightheadedness.  No shortness of breath.  No leg pain or swelling.  He denies abdominal pain.  No nausea or vomiting.  No diarrhea.  No hematochezia or melena.  He says, \"I have anemia.\"      Allergies:  Allergies   Allergen Reactions    Latex Itching    Cipro [Ciprofloxacin] Itching and Rash     Problem List:    Patient Active Problem List    Diagnosis Date Noted    Primary parkinsonism (H) 03/04/2022     Priority: Medium     Formatting of this note might be different from the original.  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     Priority: Medium    Hemorrhoids 12/08/2006     Priority: Medium      Past Medical History:    Past Medical History:   Diagnosis Date    Cancer (H) " 2014    Gastro-oesophageal reflux disease     Syncope      Past Surgical History:    Past Surgical History:   Procedure Laterality Date    APPENDECTOMY      CHOLECYSTECTOMY      REPAIR PTOSIS BILATERAL Bilateral 11/15/2023    Procedure: Bilateral direct browplasty;  Surgeon: Ruth Hatch MD;  Location: [facility]     Family History:    Family History   Problem Relation Age of Onset    Hypertension Father      Social History:  Marital Status:   [5]  Social History     Tobacco Use    Smoking status: Former     Current packs/day: 0.00     Types: Cigarettes     Quit date: 1969     Years since quittin.0    Smokeless tobacco: Never      Medications:    acetaminophen (TYLENOL) 325 MG tablet  acetaminophen (TYLENOL) 500 MG tablet  albuterol (PROAIR HFA/PROVENTIL HFA/VENTOLIN HFA) 108 (90 Base) MCG/ACT inhaler  ALLOPURINOL 300 MG OR TABS  aspirin 81 MG chewable tablet  azelastine (ASTELIN) 0.1 % nasal spray  benzonatate (TESSALON) 200 MG capsule  carbidopa-levodopa (SINEMET)  MG tablet  cephALEXin (KEFLEX) 500 MG capsule  clotrimazole (LOTRIMIN) 1 % external cream  cyanocobalamin 1000 MCG/ML injection  erythromycin (ROMYCIN) 5 MG/GM ophthalmic ointment  fluorouracil (EFUDEX) 5 % external cream  indapamide (LOZOL) 2.5 MG tablet  loperamide (IMODIUM A-D) 2 MG tablet  metoprolol succinate ER (TOPROL XL) 50 MG 24 hr tablet  nystatin (MYCOSTATIN) cream  potassium chloride SA (K-DUR/KLOR-CON M) 10 MEQ CR tablet  propranolol ER (INDERAL LA) 80 MG 24 hr capsule  triamcinolone (KENALOG) 0.1 % external cream      Review of Systems   All other systems reviewed and are negative.      PE   BP: 110/79  Pulse: 66  Temp: 97.5  F (36.4  C)  Resp: 16  SpO2: 95 %  Physical Exam  Vitals and nursing note reviewed.   Constitutional:       General: He is not in acute distress.  HENT:      Head:      Comments: There is a large hematoma on the left posterior scalp.  No surrounding step-off or depression.     Right  Ear: External ear normal.      Left Ear: External ear normal.      Nose: Nose normal.      Mouth/Throat:      Mouth: Mucous membranes are moist.      Pharynx: Oropharynx is clear.   Eyes:      General: No scleral icterus.     Extraocular Movements: Extraocular movements intact.      Conjunctiva/sclera: Conjunctivae normal.      Pupils: Pupils are equal, round, and reactive to light.   Neck:      Comments: There is midline cervical tenderness.  Cardiovascular:      Rate and Rhythm: Normal rate.   Pulmonary:      Effort: Pulmonary effort is normal. No respiratory distress.   Musculoskeletal:         General: Normal range of motion.   Skin:     General: Skin is warm and dry.   Neurological:      Mental Status: He is alert and oriented to person, place, and time.   Psychiatric:         Behavior: Behavior normal.         ED COURSE and MDM   1153.  Patient with fall and head trauma, headache, neck pain with midline tenderness.  CT imaging of the head and cervical spine pending.  Lab values, urine analysis, chest x-ray pending to look for source of falling.    1421.  Patient with abnormal urinalysis, concerning for infection.  This is the likely cause of his repeated falling.  No concerning injury with his fall.  Results reviewed with the patient and his son who was present in the room throughout.  Keflex 500 mg twice a day for 7 days.  Return for worsening.    Electronic medical chart reviewed, including medical problems, medications, medical allergies, social history.  Recent hospitalizations and surgical procedures reviewed.  Recent clinic visits and consultations reviewed.  Recent labs and test results reviewed.  Nursing notes reviewed.    The patient, their parent if applicable, and/or their medical decision maker(s) and I have reviewed all of the available historical information, applicable PMH, physical exam findings, and objective diagnostic data gathered during this ED visit.  We then discussed all work-up options  and then together agreed upon the course taken during this visit.  The ultimate disposition and plan was a cooperative decision made between myself and the patient, their parent if applicable, and/or their legal decision maker(s).  The risks and benefits of all decisions made during this visit were discussed to the best of my abilities given the circumstances, and all parties are understanding of the pertinent ramifications of these decisions.      LABS  Labs Ordered and Resulted from Time of ED Arrival to Time of ED Departure   BASIC METABOLIC PANEL - Abnormal       Result Value    Sodium 140      Potassium 3.6      Chloride 100      Carbon Dioxide (CO2) 31 (*)     Anion Gap 9      Urea Nitrogen 21.7      Creatinine 1.03      GFR Estimate 67      Calcium 9.3      Glucose 108 (*)    ROUTINE UA WITH MICROSCOPIC REFLEX TO CULTURE - Abnormal    Color Urine Yellow      Appearance Urine Slightly Cloudy (*)     Glucose Urine Negative      Bilirubin Urine Negative      Ketones Urine Negative      Specific Gravity Urine 1.015      Blood Urine Small (*)     pH Urine 6.5      Protein Albumin Urine 10 (*)     Urobilinogen Urine Normal      Nitrite Urine Negative      Leukocyte Esterase Urine Large (*)     Bacteria Urine Few (*)     RBC Urine 24 (*)     WBC Urine >182 (*)     Squamous Epithelials Urine <1     CBC WITH PLATELETS AND DIFFERENTIAL - Abnormal    WBC Count 7.6      RBC Count 3.47 (*)     Hemoglobin 11.4 (*)     Hematocrit 34.5 (*)     MCV 99      MCH 32.9      MCHC 33.0      RDW 13.7      Platelet Count 137 (*)     % Neutrophils 72      % Lymphocytes 16      % Monocytes 9      % Eosinophils 2      % Basophils 0      % Immature Granulocytes 1      NRBCs per 100 WBC 0      Absolute Neutrophils 5.4      Absolute Lymphocytes 1.2      Absolute Monocytes 0.7      Absolute Eosinophils 0.1      Absolute Basophils 0.0      Absolute Immature Granulocytes 0.1      Absolute NRBCs 0.0     URINE CULTURE       IMAGING  Images  reviewed by me.  Radiology report also reviewed.  XR Chest 2 Views   Final Result   IMPRESSION: No focal airspace opacities, pleural effusion or   pneumothorax. Cardiac and mediastinal silhouettes are normal. No   displaced rib fractures. Stable calcified granuloma in the right upper   lobe..      HARLEY RIVERA MD            SYSTEM ID:  [id number]      CT Cervical Spine w/o Contrast   Final Result   IMPRESSION: No evidence of acute fracture or subluxation in the   cervical spine.       AMPARO OCONNOR MD            SYSTEM ID:  [id number]      CT Head w/o Contrast   Final Result   IMPRESSION:      1. No evidence of acute intracranial hemorrhage, mass, or herniation.   2. Diffuse parenchymal volume loss and white matter changes likely due   to chronic microvascular ischemic change.   3. Left parieto-occipital scalp hematoma.         AMPARO OCONNOR MD            SYSTEM ID:  [id number]          Procedures    Medications - No data to display      IMPRESSION       ICD-10-CM    1. Acute cystitis without hematuria  N30.00       2. Multiple falls  R29.6                Medication List        Started      cephALEXin 500 MG capsule  Commonly known as: KEFLEX  500 mg, Oral, 2 TIMES DAILY                                  Roge Downs MD  12/27/24 [id number]

## 2024-12-28 LAB — BACTERIA UR CULT: NORMAL

## 2025-01-01 ENCOUNTER — APPOINTMENT (OUTPATIENT)
Dept: CT IMAGING | Facility: CLINIC | Age: OVER 89
End: 2025-01-01
Attending: EMERGENCY MEDICINE
Payer: MEDICARE

## 2025-01-01 ENCOUNTER — HOSPITAL ENCOUNTER (EMERGENCY)
Facility: CLINIC | Age: OVER 89
Discharge: HOME | End: 2025-01-01
Attending: EMERGENCY MEDICINE
Payer: MEDICARE

## 2025-01-01 VITALS
WEIGHT: 198 LBS | OXYGEN SATURATION: 93 % | TEMPERATURE: 97.4 F | BODY MASS INDEX: 28.41 KG/M2 | HEART RATE: 56 BPM | SYSTOLIC BLOOD PRESSURE: 110 MMHG | DIASTOLIC BLOOD PRESSURE: 67 MMHG | RESPIRATION RATE: 18 BRPM
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
## 2025-01-01 DIAGNOSIS — S22.21XA FRACTURE OF MANUBRIUM, INITIAL ENCOUNTER FOR CLOSED FRACTURE: ICD-10-CM

## 2025-01-01 DIAGNOSIS — M89.9 LYTIC LESION OF BONE ON X-RAY: ICD-10-CM

## 2025-01-01 LAB
ANION GAP SERPL CALCULATED.3IONS-SCNC: 8 MMOL/L (ref 7–15)
BASOPHILS # BLD AUTO: 0.1 10E3/UL (ref 0–0.2)
BASOPHILS NFR BLD AUTO: 1 %
BUN SERPL-MCNC: 24.2 MG/DL (ref 8–23)
CALCIUM SERPL-MCNC: 9.1 MG/DL (ref 8.8–10.4)
CHLORIDE SERPL-SCNC: 100 MMOL/L (ref 98–107)
CREAT SERPL-MCNC: 0.99 MG/DL (ref 0.67–1.17)
EGFRCR SERPLBLD CKD-EPI 2021: 71 ML/MIN/1.73M2
EOSINOPHIL # BLD AUTO: 0.2 10E3/UL (ref 0–0.7)
EOSINOPHIL NFR BLD AUTO: 2 %
ERYTHROCYTE [DISTWIDTH] IN BLOOD BY AUTOMATED COUNT: 14 % (ref 10–15)
GLUCOSE SERPL-MCNC: 112 MG/DL (ref 70–99)
HCO3 SERPL-SCNC: 30 MMOL/L (ref 22–29)
HCT VFR BLD AUTO: 33.9 % (ref 40–53)
HGB BLD-MCNC: 11.4 G/DL (ref 13.3–17.7)
HOLD SPECIMEN: NORMAL
HOLD SPECIMEN: NORMAL
IMM GRANULOCYTES # BLD: 0.2 10E3/UL
IMM GRANULOCYTES NFR BLD: 2 %
LYMPHOCYTES # BLD AUTO: 1 10E3/UL (ref 0.8–5.3)
LYMPHOCYTES NFR BLD AUTO: 10 %
MCH RBC QN AUTO: 33.2 PG (ref 26.5–33)
MCHC RBC AUTO-ENTMCNC: 33.6 G/DL (ref 31.5–36.5)
MCV RBC AUTO: 99 FL (ref 78–100)
MONOCYTES # BLD AUTO: 0.8 10E3/UL (ref 0–1.3)
MONOCYTES NFR BLD AUTO: 8 %
NEUTROPHILS # BLD AUTO: 7.3 10E3/UL (ref 1.6–8.3)
NEUTROPHILS NFR BLD AUTO: 78 %
NRBC # BLD AUTO: 0 10E3/UL
NRBC BLD AUTO-RTO: 0 /100
PLATELET # BLD AUTO: 181 10E3/UL (ref 150–450)
POTASSIUM SERPL-SCNC: 4 MMOL/L (ref 3.4–5.3)
RAD FLAG-ADDENDUM: NORMAL
RBC # BLD AUTO: 3.43 10E6/UL (ref 4.4–5.9)
SODIUM SERPL-SCNC: 138 MMOL/L (ref 135–145)
WBC # BLD AUTO: 9.5 10E3/UL (ref 4–11)

## 2025-01-01 PROCEDURE — 71260 CT THORAX DX C+: CPT

## 2025-01-01 PROCEDURE — 82310 ASSAY OF CALCIUM: CPT | Performed by: EMERGENCY MEDICINE

## 2025-01-01 PROCEDURE — 70450 CT HEAD/BRAIN W/O DYE: CPT

## 2025-01-01 PROCEDURE — 72128 CT CHEST SPINE W/O DYE: CPT

## 2025-01-01 PROCEDURE — 72125 CT NECK SPINE W/O DYE: CPT

## 2025-01-01 PROCEDURE — 99285 EMERGENCY DEPT VISIT HI MDM: CPT | Performed by: EMERGENCY MEDICINE

## 2025-01-01 PROCEDURE — 250N000011 HC RX IP 250 OP 636: Performed by: EMERGENCY MEDICINE

## 2025-01-01 PROCEDURE — 85004 AUTOMATED DIFF WBC COUNT: CPT | Performed by: EMERGENCY MEDICINE

## 2025-01-01 PROCEDURE — 250N000009 HC RX 250: Performed by: EMERGENCY MEDICINE

## 2025-01-01 PROCEDURE — 85014 HEMATOCRIT: CPT | Performed by: EMERGENCY MEDICINE

## 2025-01-01 PROCEDURE — 80048 BASIC METABOLIC PNL TOTAL CA: CPT | Performed by: EMERGENCY MEDICINE

## 2025-01-01 PROCEDURE — 36415 COLL VENOUS BLD VENIPUNCTURE: CPT | Performed by: EMERGENCY MEDICINE

## 2025-01-01 PROCEDURE — 99285 EMERGENCY DEPT VISIT HI MDM: CPT | Mod: 25 | Performed by: EMERGENCY MEDICINE

## 2025-01-01 RX ORDER — IOPAMIDOL 755 MG/ML
97 INJECTION, SOLUTION INTRAVASCULAR ONCE
Status: COMPLETED | OUTPATIENT
Start: 2025-01-01 | End: 2025-01-01

## 2025-01-01 RX ADMIN — IOPAMIDOL 97 ML: 755 INJECTION, SOLUTION INTRAVENOUS at 17:35

## 2025-01-01 RX ADMIN — SODIUM CHLORIDE 64 ML: 9 INJECTION, SOLUTION INTRAVENOUS at 17:35

## 2025-01-01 ASSESSMENT — ACTIVITIES OF DAILY LIVING (ADL)
ADLS_ACUITY_SCORE: 42

## 2025-01-01 ASSESSMENT — COLUMBIA-SUICIDE SEVERITY RATING SCALE - C-SSRS
6. HAVE YOU EVER DONE ANYTHING, STARTED TO DO ANYTHING, OR PREPARED TO DO ANYTHING TO END YOUR LIFE?: NO
1. IN THE PAST MONTH, HAVE YOU WISHED YOU WERE DEAD OR WISHED YOU COULD GO TO SLEEP AND NOT WAKE UP?: NO
2. HAVE YOU ACTUALLY HAD ANY THOUGHTS OF KILLING YOURSELF IN THE PAST MONTH?: NO

## 2025-01-01 NOTE — ED TRIAGE NOTES
Multiple falls over the past week; was seen on 12/27, fell again today, pt reports left shoulder pain, head pain and chest pain, mostly upper right chest. Pt is A&O answering questions appropriately, family now at bedside.      Triage Assessment (Adult)       Row Name 01/01/25 1454          Triage Assessment    Airway WDL WDL        Respiratory WDL    Respiratory WDL WDL        Skin Circulation/Temperature WDL    Skin Circulation/Temperature WDL --  pink area on left shoulder right elbow right shoulder and chest        Cardiac WDL    Cardiac WDL WDL        Peripheral/Neurovascular WDL    Peripheral Neurovascular WDL WDL        Cognitive/Neuro/Behavioral WDL    Cognitive/Neuro/Behavioral WDL WDL

## 2025-01-01 NOTE — ED PROVIDER NOTES
History     Chief Complaint   Patient presents with    Fall     Multiple falls over the past week; was seen on 12/27, fell again today, pt reports left shoulder pain, head pain and chest pain, mostly upper right chest. Pt is A&O answering questions appropriately, family now at bedside     HPI  Vannesa Alvarez is a 89 year old male who presents with son and daughter for evaluation of fall.  Has had several falls over the past week.  Does have Parkinson's and sees neurology.  Of note, does have virtual appointment tomorrow.  Is on carbidopa levodopa.  Also being treated for urinary retention and urinary tract infection.  Patient tells me that he was slurring his medications seeing at the counter and fell backwards.  Has some pain in his neck and left shoulder across his back.  No vomiting.  Not anticoagulated.  At his usual state of health at time of fall.  Uses a walker at baseline.    The patient's PMHx, Surgical Hx, Allergies, and Medications were all reviewed with the patient.    Allergies:  Allergies   Allergen Reactions    Latex Itching    Cipro [Ciprofloxacin] Itching and Rash       Problem List:    Patient Active Problem List    Diagnosis Date Noted    Primary parkinsonism (H) 03/04/2022     Priority: Medium     Formatting of this note might be different from the original.  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     Priority: Medium    Hemorrhoids 12/08/2006     Priority: Medium        Past Medical History:    Past Medical History:   Diagnosis Date    Cancer (H) 5/2014    Gastro-oesophageal reflux  disease     Syncope        Past Surgical History:    Past Surgical History:   Procedure Laterality Date    APPENDECTOMY      CHOLECYSTECTOMY      REPAIR PTOSIS BILATERAL Bilateral 11/15/2023    Procedure: Bilateral direct browplasty;  Surgeon: Ruth Hatch MD;  Location: [facility]       Family History:    Family History   Problem Relation Age of Onset    Hypertension Father        Social History:  Marital Status:   [5]  Social History     Tobacco Use    Smoking status: Former     Current packs/day: 0.00     Types: Cigarettes     Quit date: 1969     Years since quittin.0    Smokeless tobacco: Never        Medications:    acetaminophen (TYLENOL) 325 MG tablet  acetaminophen (TYLENOL) 500 MG tablet  albuterol (PROAIR HFA/PROVENTIL HFA/VENTOLIN HFA) 108 (90 Base) MCG/ACT inhaler  ALLOPURINOL 300 MG OR TABS  aspirin 81 MG chewable tablet  azelastine (ASTELIN) 0.1 % nasal spray  benzonatate (TESSALON) 200 MG capsule  carbidopa-levodopa (SINEMET)  MG tablet  cephALEXin (KEFLEX) 500 MG capsule  clotrimazole (LOTRIMIN) 1 % external cream  cyanocobalamin 1000 MCG/ML injection  erythromycin (ROMYCIN) 5 MG/GM ophthalmic ointment  fluorouracil (EFUDEX) 5 % external cream  indapamide (LOZOL) 2.5 MG tablet  loperamide (IMODIUM A-D) 2 MG tablet  metoprolol succinate ER (TOPROL XL) 50 MG 24 hr tablet  nystatin (MYCOSTATIN) cream  potassium chloride SA (K-DUR/KLOR-CON M) 10 MEQ CR tablet  propranolol ER (INDERAL LA) 80 MG 24 hr capsule  triamcinolone (KENALOG) 0.1 % external cream          Review of Systems  Pertinent positives and negatives mentioned in HPI    Physical Exam   BP: 120/83  Pulse: 62  Temp: 97.4  F (36.3  C)  Resp: 18  Weight: 89.8 kg (198 lb)  SpO2: 96 %    GEN: Awake, alert, and cooperative. No acute distress. Resting comfortably watching television.   HENT: no hemotympanum or otorrhea. No retroauricular ecchymosis or tenderness.   EYES: EOM intact. Conjunctiva clear. No discharge. No  RAPD  NECK: mild midline tenderness in mid cervical spine.   CV : Regular rate and rhythm.  PULM: Normal effort. No wheezes, rales, or rhonchi bilaterally.  ABD: Soft, non-tender, non-distended. No rebound or guarding.   NEURO: Normal speech. Following commands. Answering questions and interacting appropriately.   EXT: No gross deformity. Warm and well perfused.  INT: Warm. No diaphoresis. Normal color.        ED Course        Procedures                 Critical Care time:  none              Results for orders placed or performed during the hospital encounter of 01/01/25 (from the past 24 hours)   [place] Draw    Narrative    The following orders were created for panel order [place] Draw.  Procedure                               Abnormality         Status                     ---------                               -----------         ------                     Extra Green Top (Lithium...[[id number]]                      Final result               Extra Purple Top Tube[[id number]]                            Final result                 Please view results for these tests on the individual orders.   Extra Green Top (Lithium Heparin) Tube   Result Value Ref Range    Hold Specimen JIC    Extra Purple Top Tube   Result Value Ref Range    Hold Specimen JIC    Basic metabolic panel   Result Value Ref Range    Sodium 138 135 - 145 mmol/L    Potassium 4.0 3.4 - 5.3 mmol/L    Chloride 100 98 - 107 mmol/L    Carbon Dioxide (CO2) 30 (H) 22 - 29 mmol/L    Anion Gap 8 7 - 15 mmol/L    Urea Nitrogen 24.2 (H) 8.0 - 23.0 mg/dL    Creatinine 0.99 0.67 - 1.17 mg/dL    GFR Estimate 71 >60 mL/min/1.73m2    Calcium 9.1 8.8 - 10.4 mg/dL    Glucose 112 (H) 70 - 99 mg/dL   CBC with platelets, differential    Narrative    The following orders were created for panel order CBC with platelets, differential.  Procedure                               Abnormality         Status                     ---------                               -----------          ------                     CBC with platelets and d...[[id number]]  Abnormal            Final result               RBC and Platelet Morphology[[id number]]                                                   Please view results for these tests on the individual orders.   CBC with platelets and differential   Result Value Ref Range    WBC Count 9.5 4.0 - 11.0 10e3/uL    RBC Count 3.43 (L) 4.40 - 5.90 10e6/uL    Hemoglobin 11.4 (L) 13.3 - 17.7 g/dL    Hematocrit 33.9 (L) 40.0 - 53.0 %    MCV 99 78 - 100 fL    MCH 33.2 (H) 26.5 - 33.0 pg    MCHC 33.6 31.5 - 36.5 g/dL    RDW 14.0 10.0 - 15.0 %    Platelet Count 181 150 - 450 10e3/uL    % Neutrophils 78 %    % Lymphocytes 10 %    % Monocytes 8 %    % Eosinophils 2 %    % Basophils 1 %    % Immature Granulocytes 2 %    NRBCs per 100 WBC 0 <1 /100    Absolute Neutrophils 7.3 1.6 - 8.3 10e3/uL    Absolute Lymphocytes 1.0 0.8 - 5.3 10e3/uL    Absolute Monocytes 0.8 0.0 - 1.3 10e3/uL    Absolute Eosinophils 0.2 0.0 - 0.7 10e3/uL    Absolute Basophils 0.1 0.0 - 0.2 10e3/uL    Absolute Immature Granulocytes 0.2 <=0.4 10e3/uL    Absolute NRBCs 0.0 10e3/uL   Head CT w/o contrast    Narrative    EXAM: CT HEAD W/O CONTRAST, CT CERVICAL SPINE W/O CONTRAST, CT THORACIC SPINE W/O CONTRAST  LOCATION: [facility]  DATE: 1/1/2025    INDICATION: ground level fall  COMPARISON: December 27, 2024, CT abdomen pelvis December 23, 2024  TECHNIQUE:   1) Routine CT Head without IV contrast. Multiplanar reformats. Dose reduction techniques were used.   2) Routine CT Cervical Spine without IV contrast. Multiplanar reformats. Dose reduction techniques were used.   3) Routine CT Thoracic Spine without IV contrast. Multiplanar reformats. Dose reduction techniques were used.     FINDINGS:   HEAD CT:   INTRACRANIAL CONTENTS: No intracranial hemorrhage, extraaxial collection, or mass effect.  No CT evidence of acute infarct. Mild to moderate presumed chronic small vessel  ischemic changes. Mild to moderate generalized volume loss. No hydrocephalus.     VISUALIZED ORBITS/SINUSES/MASTOIDS: Prior bilateral cataract surgery. Visualized portions of the orbits are otherwise unremarkable. No paranasal sinus mucosal disease. No middle ear or mastoid effusion.    BONES/SOFT TISSUES: Left occipital scalp soft tissue hematoma. No calvarial fracture.    CERVICAL SPINE CT:  VERTEBRA: Straightening of the normal cervical lordosis. Trace antral listhesis C2 on C3. Vertebral body heights are maintained. No fracture or posttraumatic subluxation.     CANAL/FORAMINA: No severe spinal canal stenosis. Spinal canal stenosis is moderate at C3-4, C4-5, C5-6 and C6-7. Osseous foraminal stenosis is severe on the left at C2-C3, bilaterally at C3-C4, bilaterally at C4-C5, right at C5-C6, and bilaterally at   C6-C7.    PARASPINAL: No extraspinal abnormality. Visualized lung fields are clear.    THORACIC SPINE CT:  VERTEBRA: Straightening of the normal thoracic kyphosis. Grade 1 anterolisthesis T1 on T2 chronic mild T11 and T12 vertebral body height loss is unchanged. No fracture or posttraumatic subluxation.     CANAL/FORAMINA: No severe spinal canal stenosis.    PARASPINAL: No extraspinal abnormality.        Impression    IMPRESSION:  HEAD CT:  1.  No CT evidence for acute intracranial process.  2.  Brain atrophy and presumed chronic microvascular ischemic changes as above.    CERVICAL SPINE CT:  1.  No fracture or posttraumatic subluxation.  2.  Cervical spondylosis as above.    THORACIC SPINE CT:  1.  No fracture or posttraumatic subluxation.         CT Cervical Spine w/o Contrast    Narrative    EXAM: CT HEAD W/O CONTRAST, CT CERVICAL SPINE W/O CONTRAST, CT THORACIC SPINE W/O CONTRAST  LOCATION: [facility]  DATE: 1/1/2025    INDICATION: ground level fall  COMPARISON: December 27, 2024, CT abdomen pelvis December 23, 2024  TECHNIQUE:   1) Routine CT Head without IV contrast.  Multiplanar reformats. Dose reduction techniques were used.   2) Routine CT Cervical Spine without IV contrast. Multiplanar reformats. Dose reduction techniques were used.   3) Routine CT Thoracic Spine without IV contrast. Multiplanar reformats. Dose reduction techniques were used.     FINDINGS:   HEAD CT:   INTRACRANIAL CONTENTS: No intracranial hemorrhage, extraaxial collection, or mass effect.  No CT evidence of acute infarct. Mild to moderate presumed chronic small vessel ischemic changes. Mild to moderate generalized volume loss. No hydrocephalus.     VISUALIZED ORBITS/SINUSES/MASTOIDS: Prior bilateral cataract surgery. Visualized portions of the orbits are otherwise unremarkable. No paranasal sinus mucosal disease. No middle ear or mastoid effusion.    BONES/SOFT TISSUES: Left occipital scalp soft tissue hematoma. No calvarial fracture.    CERVICAL SPINE CT:  VERTEBRA: Straightening of the normal cervical lordosis. Trace antral listhesis C2 on C3. Vertebral body heights are maintained. No fracture or posttraumatic subluxation.     CANAL/FORAMINA: No severe spinal canal stenosis. Spinal canal stenosis is moderate at C3-4, C4-5, C5-6 and C6-7. Osseous foraminal stenosis is severe on the left at C2-C3, bilaterally at C3-C4, bilaterally at C4-C5, right at C5-C6, and bilaterally at   C6-C7.    PARASPINAL: No extraspinal abnormality. Visualized lung fields are clear.    THORACIC SPINE CT:  VERTEBRA: Straightening of the normal thoracic kyphosis. Grade 1 anterolisthesis T1 on T2 chronic mild T11 and T12 vertebral body height loss is unchanged. No fracture or posttraumatic subluxation.     CANAL/FORAMINA: No severe spinal canal stenosis.    PARASPINAL: No extraspinal abnormality.        Impression    IMPRESSION:  HEAD CT:  1.  No CT evidence for acute intracranial process.  2.  Brain atrophy and presumed chronic microvascular ischemic changes as above.    CERVICAL SPINE CT:  1.  No fracture or posttraumatic  subluxation.  2.  Cervical spondylosis as above.    THORACIC SPINE CT:  1.  No fracture or posttraumatic subluxation.         CT Thoracic Spine w/o Contrast    Narrative    EXAM: CT HEAD W/O CONTRAST, CT CERVICAL SPINE W/O CONTRAST, CT THORACIC SPINE W/O CONTRAST  LOCATION: [facility]  DATE: 1/1/2025    INDICATION: ground level fall  COMPARISON: December 27, 2024, CT abdomen pelvis December 23, 2024  TECHNIQUE:   1) Routine CT Head without IV contrast. Multiplanar reformats. Dose reduction techniques were used.   2) Routine CT Cervical Spine without IV contrast. Multiplanar reformats. Dose reduction techniques were used.   3) Routine CT Thoracic Spine without IV contrast. Multiplanar reformats. Dose reduction techniques were used.     FINDINGS:   HEAD CT:   INTRACRANIAL CONTENTS: No intracranial hemorrhage, extraaxial collection, or mass effect.  No CT evidence of acute infarct. Mild to moderate presumed chronic small vessel ischemic changes. Mild to moderate generalized volume loss. No hydrocephalus.     VISUALIZED ORBITS/SINUSES/MASTOIDS: Prior bilateral cataract surgery. Visualized portions of the orbits are otherwise unremarkable. No paranasal sinus mucosal disease. No middle ear or mastoid effusion.    BONES/SOFT TISSUES: Left occipital scalp soft tissue hematoma. No calvarial fracture.    CERVICAL SPINE CT:  VERTEBRA: Straightening of the normal cervical lordosis. Trace antral listhesis C2 on C3. Vertebral body heights are maintained. No fracture or posttraumatic subluxation.     CANAL/FORAMINA: No severe spinal canal stenosis. Spinal canal stenosis is moderate at C3-4, C4-5, C5-6 and C6-7. Osseous foraminal stenosis is severe on the left at C2-C3, bilaterally at C3-C4, bilaterally at C4-C5, right at C5-C6, and bilaterally at   C6-C7.    PARASPINAL: No extraspinal abnormality. Visualized lung fields are clear.    THORACIC SPINE CT:  VERTEBRA: Straightening of the normal thoracic  kyphosis. Grade 1 anterolisthesis T1 on T2 chronic mild T11 and T12 vertebral body height loss is unchanged. No fracture or posttraumatic subluxation.     CANAL/FORAMINA: No severe spinal canal stenosis.    PARASPINAL: No extraspinal abnormality.        Impression    IMPRESSION:  HEAD CT:  1.  No CT evidence for acute intracranial process.  2.  Brain atrophy and presumed chronic microvascular ischemic changes as above.    CERVICAL SPINE CT:  1.  No fracture or posttraumatic subluxation.  2.  Cervical spondylosis as above.    THORACIC SPINE CT:  1.  No fracture or posttraumatic subluxation.         CT Chest w Contrast   Result Value Ref Range    Rad Flag-Addendum Lung nodule     Addendum: 1/1/2025    CLINICAL ADDENDUM:   Clinical information in this report has been modified from the previous version as follows: Incidental right upper lobe 0.4 cm nodule, indeterminate. Consider follow-up per oncologic protocol.      [Consider Follow Up: Lung nodule]    This report will be copied to the [facility] to ensure a provider acknowledges the finding.     END ADDENDUM      Narrative    EXAM: CT CHEST W CONTRAST  LOCATION: [facility]  DATE: 1/1/2025    INDICATION: mid thoracic tenderness, sternal tenderness in setting of fall.  COMPARISON: CT urogram 12/23/2024  TECHNIQUE: CT chest with IV contrast. Multiplanar reformats were obtained. Dose reduction techniques were used.    CONTRAST: 97 mL Isovue 370    FINDINGS:   LUNGS AND PLEURA: Scattered linear atelectasis or scarring. No focal airspace disease. No pleural effusion or pneumothorax. Right upper lobe 0.4 cm nodule (series 6, image 87). Additional right upper lobe calcified nodule, compatible with old   granulomatous disease.    MEDIASTINUM/AXILLAE: Multiple calcified right paratracheal lymph nodes, compatible with old granulomatous disease. No lymphadenopathy. No pericardial effusion. Mild aortic calcifications.    CORONARY ARTERY  CALCIFICATION: Severe.    UPPER ABDOMEN: Redemonstrated right hepatic lobe lesion measuring 3.8 x 3.6 cm. Redemonstrated left hepatic lobe lesion measuring 1.7 x 1.3 cm. Punctate calcifications in the spleen, compatible with old granulomatous disease. Redemonstrated mesenteric   fat stranding in the left upper quadrant with sparing around the mesenteric lymph nodes, suggestive of mesenteric panniculitis. Redemonstrated left renal simple cysts; no follow-up indicated.    MUSCULOSKELETAL: Lytic lesion in the manubrium measuring 3.7 x 2.3 cm, with cortical breakthrough anteriorly and posteriorly. There is associated fracture of the manubrium. Moderate adjacent fat stranding. No additional acute fracture or additional   aggressive osseous lesions identified. Multilevel degenerative changes of the spine.      Impression    IMPRESSION:   1.  Lytic 3.7 cm lesion in the manubrium, with associated pathologic fracture, suspicious for metastatic disease.    2.  No additional acute fracture or additional aggressive osseous lesions identified. If clinical for additional sites of metastatic disease, consider bone scan.       Medications   iopamidol (ISOVUE-370) solution 97 mL (97 mLs Intravenous $Given 1/1/25 1735)   sodium chloride 0.9 % bag 500mL for CT scan flush use (64 mLs Intravenous $Given 1/1/25 1735)       Assessments & Plan (with Medical Decision Making)   89 year old male with past medical history notable for Parkinson's on Sinemet, follows with urology, frequent falls with ground-level fall today detailed in HPI.  Imaging of head, cervical spine, chest and thoracic spine pending.  Patient is resting comfortably.     CT head cervical spine thoracic spine reassuring.  CT of chest with 3.7 cm lytic lesion in manubrium with likely pathologic fracture.  This is suspicious for possible malignancy.  Patient does have a remote history of a possible gastric tumor which was treated with oral medications many years ago.  He said  surgery was discussed and felt not indicated at the time.  Also has issues with prostate cancer and recently had PSA rechecked and is following with urology.    Patient otherwise feels well.  He does have a follow-up appointment tomorrow morning with his neurologist who is managing his Parkinson's.  Also has an appointment with his oncologist next Friday.  This is at the request of urologist with concern for possible recurrence of prostate cancer.  Also has a primary care appointment on Monday.    Case discussed with cardiac surgery however not specifically thoracic and forgot however based on conversation I felt comfortable that this would be a nonoperative management and recommended lifting restrictions.  His oncology appointment I think is of more importance.  Patient is comfortable going home.  He does live with his daughter.  We did discuss hospitalization for ADLs etc. but not acute medical workup/treatment.  He would like to go home and again I think this is reasonable.  Also recommended that he has alena conversations with his children who are both present at bedside and supportive regarding his wishes values and goals of care.  Will be many upcoming questions and potential evaluations.  Stressed to him that he can choose to do as much or as little as he is comfortable with.  To return to emergency department if symptoms worsen.             I have reviewed the nursing notes.         New Prescriptions    No medications on file       Final diagnoses:   Fall, initial encounter   Fracture of manubrium, initial encounter for closed fracture   Lytic lesion of bone on x-ray - manubrium on CT, concern for pathologic fracture     Santino Conner MD        1/1/2025   [facility] EMERGENCY DEPT    Disclaimer: This note consists of words and symbols derived from keyboarding and dictation using voice recognition software.  As a result, there may be errors that have gone undetected.  Please consider this  when interpreting information found in this note.               Santino Conner MD  01/01/25 [id number]

## 2025-01-01 NOTE — H&P (VIEW-ONLY)
History     Chief Complaint   Patient presents with    Fall     Multiple falls over the past week; was seen on 12/27, fell again today, pt reports left shoulder pain, head pain and chest pain, mostly upper right chest. Pt is A&O answering questions appropriately, family now at bedside     HPI  Vannesa Alvarez is a 89 year old male who presents with son and daughter for evaluation of fall.  Has had several falls over the past week.  Does have Parkinson's and sees neurology.  Of note, does have virtual appointment tomorrow.  Is on carbidopa levodopa.  Also being treated for urinary retention and urinary tract infection.  Patient tells me that he was slurring his medications seeing at the counter and fell backwards.  Has some pain in his neck and left shoulder across his back.  No vomiting.  Not anticoagulated.  At his usual state of health at time of fall.  Uses a walker at baseline.    The patient's PMHx, Surgical Hx, Allergies, and Medications were all reviewed with the patient.    Allergies:  Allergies   Allergen Reactions    Latex Itching    Cipro [Ciprofloxacin] Itching and Rash       Problem List:    Patient Active Problem List    Diagnosis Date Noted    Primary parkinsonism (H) 03/04/2022     Priority: Medium     Formatting of this note might be different from the original.  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     Priority: Medium    Hemorrhoids 12/08/2006     Priority: Medium        Past Medical History:    Past Medical History:   Diagnosis Date    Cancer (H) 5/2014    Gastro-oesophageal reflux  disease     Syncope        Past Surgical History:    Past Surgical History:   Procedure Laterality Date    APPENDECTOMY      CHOLECYSTECTOMY      REPAIR PTOSIS BILATERAL Bilateral 11/15/2023    Procedure: Bilateral direct browplasty;  Surgeon: Ruth Hatch MD;  Location: [facility]       Family History:    Family History   Problem Relation Age of Onset    Hypertension Father        Social History:  Marital Status:   [5]  Social History     Tobacco Use    Smoking status: Former     Current packs/day: 0.00     Types: Cigarettes     Quit date: 1969     Years since quittin.0    Smokeless tobacco: Never        Medications:    acetaminophen (TYLENOL) 325 MG tablet  acetaminophen (TYLENOL) 500 MG tablet  albuterol (PROAIR HFA/PROVENTIL HFA/VENTOLIN HFA) 108 (90 Base) MCG/ACT inhaler  ALLOPURINOL 300 MG OR TABS  aspirin 81 MG chewable tablet  azelastine (ASTELIN) 0.1 % nasal spray  benzonatate (TESSALON) 200 MG capsule  carbidopa-levodopa (SINEMET)  MG tablet  cephALEXin (KEFLEX) 500 MG capsule  clotrimazole (LOTRIMIN) 1 % external cream  cyanocobalamin 1000 MCG/ML injection  erythromycin (ROMYCIN) 5 MG/GM ophthalmic ointment  fluorouracil (EFUDEX) 5 % external cream  indapamide (LOZOL) 2.5 MG tablet  loperamide (IMODIUM A-D) 2 MG tablet  metoprolol succinate ER (TOPROL XL) 50 MG 24 hr tablet  nystatin (MYCOSTATIN) cream  potassium chloride SA (K-DUR/KLOR-CON M) 10 MEQ CR tablet  propranolol ER (INDERAL LA) 80 MG 24 hr capsule  triamcinolone (KENALOG) 0.1 % external cream          Review of Systems  Pertinent positives and negatives mentioned in HPI    Physical Exam   BP: 120/83  Pulse: 62  Temp: 97.4  F (36.3  C)  Resp: 18  Weight: 89.8 kg (198 lb)  SpO2: 96 %    GEN: Awake, alert, and cooperative. No acute distress. Resting comfortably watching television.   HENT: no hemotympanum or otorrhea. No retroauricular ecchymosis or tenderness.   EYES: EOM intact. Conjunctiva clear. No discharge. No  RAPD  NECK: mild midline tenderness in mid cervical spine.   CV : Regular rate and rhythm.  PULM: Normal effort. No wheezes, rales, or rhonchi bilaterally.  ABD: Soft, non-tender, non-distended. No rebound or guarding.   NEURO: Normal speech. Following commands. Answering questions and interacting appropriately.   EXT: No gross deformity. Warm and well perfused.  INT: Warm. No diaphoresis. Normal color.        ED Course        Procedures                 Critical Care time:  none              Results for orders placed or performed during the hospital encounter of 01/01/25 (from the past 24 hours)   [place] Draw    Narrative    The following orders were created for panel order [place] Draw.  Procedure                               Abnormality         Status                     ---------                               -----------         ------                     Extra Green Top (Lithium...[[id number]]                      Final result               Extra Purple Top Tube[[id number]]                            Final result                 Please view results for these tests on the individual orders.   Extra Green Top (Lithium Heparin) Tube   Result Value Ref Range    Hold Specimen JIC    Extra Purple Top Tube   Result Value Ref Range    Hold Specimen JIC    Basic metabolic panel   Result Value Ref Range    Sodium 138 135 - 145 mmol/L    Potassium 4.0 3.4 - 5.3 mmol/L    Chloride 100 98 - 107 mmol/L    Carbon Dioxide (CO2) 30 (H) 22 - 29 mmol/L    Anion Gap 8 7 - 15 mmol/L    Urea Nitrogen 24.2 (H) 8.0 - 23.0 mg/dL    Creatinine 0.99 0.67 - 1.17 mg/dL    GFR Estimate 71 >60 mL/min/1.73m2    Calcium 9.1 8.8 - 10.4 mg/dL    Glucose 112 (H) 70 - 99 mg/dL   CBC with platelets, differential    Narrative    The following orders were created for panel order CBC with platelets, differential.  Procedure                               Abnormality         Status                     ---------                               -----------          ------                     CBC with platelets and d...[[id number]]  Abnormal            Final result               RBC and Platelet Morphology[[id number]]                                                   Please view results for these tests on the individual orders.   CBC with platelets and differential   Result Value Ref Range    WBC Count 9.5 4.0 - 11.0 10e3/uL    RBC Count 3.43 (L) 4.40 - 5.90 10e6/uL    Hemoglobin 11.4 (L) 13.3 - 17.7 g/dL    Hematocrit 33.9 (L) 40.0 - 53.0 %    MCV 99 78 - 100 fL    MCH 33.2 (H) 26.5 - 33.0 pg    MCHC 33.6 31.5 - 36.5 g/dL    RDW 14.0 10.0 - 15.0 %    Platelet Count 181 150 - 450 10e3/uL    % Neutrophils 78 %    % Lymphocytes 10 %    % Monocytes 8 %    % Eosinophils 2 %    % Basophils 1 %    % Immature Granulocytes 2 %    NRBCs per 100 WBC 0 <1 /100    Absolute Neutrophils 7.3 1.6 - 8.3 10e3/uL    Absolute Lymphocytes 1.0 0.8 - 5.3 10e3/uL    Absolute Monocytes 0.8 0.0 - 1.3 10e3/uL    Absolute Eosinophils 0.2 0.0 - 0.7 10e3/uL    Absolute Basophils 0.1 0.0 - 0.2 10e3/uL    Absolute Immature Granulocytes 0.2 <=0.4 10e3/uL    Absolute NRBCs 0.0 10e3/uL   Head CT w/o contrast    Narrative    EXAM: CT HEAD W/O CONTRAST, CT CERVICAL SPINE W/O CONTRAST, CT THORACIC SPINE W/O CONTRAST  LOCATION: [facility]  DATE: 1/1/2025    INDICATION: ground level fall  COMPARISON: December 27, 2024, CT abdomen pelvis December 23, 2024  TECHNIQUE:   1) Routine CT Head without IV contrast. Multiplanar reformats. Dose reduction techniques were used.   2) Routine CT Cervical Spine without IV contrast. Multiplanar reformats. Dose reduction techniques were used.   3) Routine CT Thoracic Spine without IV contrast. Multiplanar reformats. Dose reduction techniques were used.     FINDINGS:   HEAD CT:   INTRACRANIAL CONTENTS: No intracranial hemorrhage, extraaxial collection, or mass effect.  No CT evidence of acute infarct. Mild to moderate presumed chronic small vessel  ischemic changes. Mild to moderate generalized volume loss. No hydrocephalus.     VISUALIZED ORBITS/SINUSES/MASTOIDS: Prior bilateral cataract surgery. Visualized portions of the orbits are otherwise unremarkable. No paranasal sinus mucosal disease. No middle ear or mastoid effusion.    BONES/SOFT TISSUES: Left occipital scalp soft tissue hematoma. No calvarial fracture.    CERVICAL SPINE CT:  VERTEBRA: Straightening of the normal cervical lordosis. Trace antral listhesis C2 on C3. Vertebral body heights are maintained. No fracture or posttraumatic subluxation.     CANAL/FORAMINA: No severe spinal canal stenosis. Spinal canal stenosis is moderate at C3-4, C4-5, C5-6 and C6-7. Osseous foraminal stenosis is severe on the left at C2-C3, bilaterally at C3-C4, bilaterally at C4-C5, right at C5-C6, and bilaterally at   C6-C7.    PARASPINAL: No extraspinal abnormality. Visualized lung fields are clear.    THORACIC SPINE CT:  VERTEBRA: Straightening of the normal thoracic kyphosis. Grade 1 anterolisthesis T1 on T2 chronic mild T11 and T12 vertebral body height loss is unchanged. No fracture or posttraumatic subluxation.     CANAL/FORAMINA: No severe spinal canal stenosis.    PARASPINAL: No extraspinal abnormality.        Impression    IMPRESSION:  HEAD CT:  1.  No CT evidence for acute intracranial process.  2.  Brain atrophy and presumed chronic microvascular ischemic changes as above.    CERVICAL SPINE CT:  1.  No fracture or posttraumatic subluxation.  2.  Cervical spondylosis as above.    THORACIC SPINE CT:  1.  No fracture or posttraumatic subluxation.         CT Cervical Spine w/o Contrast    Narrative    EXAM: CT HEAD W/O CONTRAST, CT CERVICAL SPINE W/O CONTRAST, CT THORACIC SPINE W/O CONTRAST  LOCATION: [facility]  DATE: 1/1/2025    INDICATION: ground level fall  COMPARISON: December 27, 2024, CT abdomen pelvis December 23, 2024  TECHNIQUE:   1) Routine CT Head without IV contrast.  Multiplanar reformats. Dose reduction techniques were used.   2) Routine CT Cervical Spine without IV contrast. Multiplanar reformats. Dose reduction techniques were used.   3) Routine CT Thoracic Spine without IV contrast. Multiplanar reformats. Dose reduction techniques were used.     FINDINGS:   HEAD CT:   INTRACRANIAL CONTENTS: No intracranial hemorrhage, extraaxial collection, or mass effect.  No CT evidence of acute infarct. Mild to moderate presumed chronic small vessel ischemic changes. Mild to moderate generalized volume loss. No hydrocephalus.     VISUALIZED ORBITS/SINUSES/MASTOIDS: Prior bilateral cataract surgery. Visualized portions of the orbits are otherwise unremarkable. No paranasal sinus mucosal disease. No middle ear or mastoid effusion.    BONES/SOFT TISSUES: Left occipital scalp soft tissue hematoma. No calvarial fracture.    CERVICAL SPINE CT:  VERTEBRA: Straightening of the normal cervical lordosis. Trace antral listhesis C2 on C3. Vertebral body heights are maintained. No fracture or posttraumatic subluxation.     CANAL/FORAMINA: No severe spinal canal stenosis. Spinal canal stenosis is moderate at C3-4, C4-5, C5-6 and C6-7. Osseous foraminal stenosis is severe on the left at C2-C3, bilaterally at C3-C4, bilaterally at C4-C5, right at C5-C6, and bilaterally at   C6-C7.    PARASPINAL: No extraspinal abnormality. Visualized lung fields are clear.    THORACIC SPINE CT:  VERTEBRA: Straightening of the normal thoracic kyphosis. Grade 1 anterolisthesis T1 on T2 chronic mild T11 and T12 vertebral body height loss is unchanged. No fracture or posttraumatic subluxation.     CANAL/FORAMINA: No severe spinal canal stenosis.    PARASPINAL: No extraspinal abnormality.        Impression    IMPRESSION:  HEAD CT:  1.  No CT evidence for acute intracranial process.  2.  Brain atrophy and presumed chronic microvascular ischemic changes as above.    CERVICAL SPINE CT:  1.  No fracture or posttraumatic  subluxation.  2.  Cervical spondylosis as above.    THORACIC SPINE CT:  1.  No fracture or posttraumatic subluxation.         CT Thoracic Spine w/o Contrast    Narrative    EXAM: CT HEAD W/O CONTRAST, CT CERVICAL SPINE W/O CONTRAST, CT THORACIC SPINE W/O CONTRAST  LOCATION: [facility]  DATE: 1/1/2025    INDICATION: ground level fall  COMPARISON: December 27, 2024, CT abdomen pelvis December 23, 2024  TECHNIQUE:   1) Routine CT Head without IV contrast. Multiplanar reformats. Dose reduction techniques were used.   2) Routine CT Cervical Spine without IV contrast. Multiplanar reformats. Dose reduction techniques were used.   3) Routine CT Thoracic Spine without IV contrast. Multiplanar reformats. Dose reduction techniques were used.     FINDINGS:   HEAD CT:   INTRACRANIAL CONTENTS: No intracranial hemorrhage, extraaxial collection, or mass effect.  No CT evidence of acute infarct. Mild to moderate presumed chronic small vessel ischemic changes. Mild to moderate generalized volume loss. No hydrocephalus.     VISUALIZED ORBITS/SINUSES/MASTOIDS: Prior bilateral cataract surgery. Visualized portions of the orbits are otherwise unremarkable. No paranasal sinus mucosal disease. No middle ear or mastoid effusion.    BONES/SOFT TISSUES: Left occipital scalp soft tissue hematoma. No calvarial fracture.    CERVICAL SPINE CT:  VERTEBRA: Straightening of the normal cervical lordosis. Trace antral listhesis C2 on C3. Vertebral body heights are maintained. No fracture or posttraumatic subluxation.     CANAL/FORAMINA: No severe spinal canal stenosis. Spinal canal stenosis is moderate at C3-4, C4-5, C5-6 and C6-7. Osseous foraminal stenosis is severe on the left at C2-C3, bilaterally at C3-C4, bilaterally at C4-C5, right at C5-C6, and bilaterally at   C6-C7.    PARASPINAL: No extraspinal abnormality. Visualized lung fields are clear.    THORACIC SPINE CT:  VERTEBRA: Straightening of the normal thoracic  kyphosis. Grade 1 anterolisthesis T1 on T2 chronic mild T11 and T12 vertebral body height loss is unchanged. No fracture or posttraumatic subluxation.     CANAL/FORAMINA: No severe spinal canal stenosis.    PARASPINAL: No extraspinal abnormality.        Impression    IMPRESSION:  HEAD CT:  1.  No CT evidence for acute intracranial process.  2.  Brain atrophy and presumed chronic microvascular ischemic changes as above.    CERVICAL SPINE CT:  1.  No fracture or posttraumatic subluxation.  2.  Cervical spondylosis as above.    THORACIC SPINE CT:  1.  No fracture or posttraumatic subluxation.         CT Chest w Contrast   Result Value Ref Range    Rad Flag-Addendum Lung nodule     Addendum: 1/1/2025    CLINICAL ADDENDUM:   Clinical information in this report has been modified from the previous version as follows: Incidental right upper lobe 0.4 cm nodule, indeterminate. Consider follow-up per oncologic protocol.      [Consider Follow Up: Lung nodule]    This report will be copied to the [facility] to ensure a provider acknowledges the finding.     END ADDENDUM      Narrative    EXAM: CT CHEST W CONTRAST  LOCATION: [facility]  DATE: 1/1/2025    INDICATION: mid thoracic tenderness, sternal tenderness in setting of fall.  COMPARISON: CT urogram 12/23/2024  TECHNIQUE: CT chest with IV contrast. Multiplanar reformats were obtained. Dose reduction techniques were used.    CONTRAST: 97 mL Isovue 370    FINDINGS:   LUNGS AND PLEURA: Scattered linear atelectasis or scarring. No focal airspace disease. No pleural effusion or pneumothorax. Right upper lobe 0.4 cm nodule (series 6, image 87). Additional right upper lobe calcified nodule, compatible with old   granulomatous disease.    MEDIASTINUM/AXILLAE: Multiple calcified right paratracheal lymph nodes, compatible with old granulomatous disease. No lymphadenopathy. No pericardial effusion. Mild aortic calcifications.    CORONARY ARTERY  CALCIFICATION: Severe.    UPPER ABDOMEN: Redemonstrated right hepatic lobe lesion measuring 3.8 x 3.6 cm. Redemonstrated left hepatic lobe lesion measuring 1.7 x 1.3 cm. Punctate calcifications in the spleen, compatible with old granulomatous disease. Redemonstrated mesenteric   fat stranding in the left upper quadrant with sparing around the mesenteric lymph nodes, suggestive of mesenteric panniculitis. Redemonstrated left renal simple cysts; no follow-up indicated.    MUSCULOSKELETAL: Lytic lesion in the manubrium measuring 3.7 x 2.3 cm, with cortical breakthrough anteriorly and posteriorly. There is associated fracture of the manubrium. Moderate adjacent fat stranding. No additional acute fracture or additional   aggressive osseous lesions identified. Multilevel degenerative changes of the spine.      Impression    IMPRESSION:   1.  Lytic 3.7 cm lesion in the manubrium, with associated pathologic fracture, suspicious for metastatic disease.    2.  No additional acute fracture or additional aggressive osseous lesions identified. If clinical for additional sites of metastatic disease, consider bone scan.       Medications   iopamidol (ISOVUE-370) solution 97 mL (97 mLs Intravenous $Given 1/1/25 1735)   sodium chloride 0.9 % bag 500mL for CT scan flush use (64 mLs Intravenous $Given 1/1/25 1735)       Assessments & Plan (with Medical Decision Making)   89 year old male with past medical history notable for Parkinson's on Sinemet, follows with urology, frequent falls with ground-level fall today detailed in HPI.  Imaging of head, cervical spine, chest and thoracic spine pending.  Patient is resting comfortably.     CT head cervical spine thoracic spine reassuring.  CT of chest with 3.7 cm lytic lesion in manubrium with likely pathologic fracture.  This is suspicious for possible malignancy.  Patient does have a remote history of a possible gastric tumor which was treated with oral medications many years ago.  He said  surgery was discussed and felt not indicated at the time.  Also has issues with prostate cancer and recently had PSA rechecked and is following with urology.    Patient otherwise feels well.  He does have a follow-up appointment tomorrow morning with his neurologist who is managing his Parkinson's.  Also has an appointment with his oncologist next Friday.  This is at the request of urologist with concern for possible recurrence of prostate cancer.  Also has a primary care appointment on Monday.    Case discussed with cardiac surgery however not specifically thoracic and forgot however based on conversation I felt comfortable that this would be a nonoperative management and recommended lifting restrictions.  His oncology appointment I think is of more importance.  Patient is comfortable going home.  He does live with his daughter.  We did discuss hospitalization for ADLs etc. but not acute medical workup/treatment.  He would like to go home and again I think this is reasonable.  Also recommended that he has alena conversations with his children who are both present at bedside and supportive regarding his wishes values and goals of care.  Will be many upcoming questions and potential evaluations.  Stressed to him that he can choose to do as much or as little as he is comfortable with.  To return to emergency department if symptoms worsen.             I have reviewed the nursing notes.         New Prescriptions    No medications on file       Final diagnoses:   Fall, initial encounter   Fracture of manubrium, initial encounter for closed fracture   Lytic lesion of bone on x-ray - manubrium on CT, concern for pathologic fracture     Santino Conner MD        1/1/2025   [facility] EMERGENCY DEPT    Disclaimer: This note consists of words and symbols derived from keyboarding and dictation using voice recognition software.  As a result, there may be errors that have gone undetected.  Please consider this  when interpreting information found in this note.               Santino Conner MD  01/01/25 [id number]

## 2025-01-02 NOTE — DISCHARGE INSTRUCTIONS
As we discussed, there is a lesion on your sternum and a fracture.  This makes me suspicious for an underlying cancer or malignancy.    I am glad that you have follow-up with oncology and you should discuss this with them for further testing.  Keep your appointment tomorrow with neurology to discuss your Parkinson's and medications with that.  Keep your appointment with your primary care provider who can help coordinate and keep track of all different specialty consults.    No lifting of 15 pounds.

## 2025-01-10 ENCOUNTER — MEDICAL CORRESPONDENCE (OUTPATIENT)
Dept: HEALTH INFORMATION MANAGEMENT | Facility: CLINIC | Age: OVER 89
End: 2025-01-10
Payer: MEDICARE

## 2025-01-15 ENCOUNTER — MYC MEDICAL ADVICE (OUTPATIENT)
Dept: INTERVENTIONAL RADIOLOGY/VASCULAR | Facility: CLINIC | Age: OVER 89
End: 2025-01-15
Payer: MEDICARE

## 2025-01-15 NOTE — TELEPHONE ENCOUNTER
Writer has spoken with Michael, Vannesa's son, regarding planned procedure with IR via telephone.      Michael acknowledges understanding of pre-procedure instructions.     Vannesa confirms on phone that he does not currently take aspirin.    I have provided Michael with IR number  for questions or concerns.    A documented H&P exam is noted in patient EMR with the date 1/1/25.  Provider name OanhHENRRY.    Juany GERMAN RN, BSN  Interventional Radiology

## 2025-01-20 ENCOUNTER — HOSPITAL ENCOUNTER (OUTPATIENT)
Dept: CT IMAGING | Facility: HOSPITAL | Age: OVER 89
Discharge: HOME | End: 2025-01-20
Attending: INTERNAL MEDICINE | Admitting: INTERNAL MEDICINE
Payer: MEDICARE

## 2025-01-20 VITALS
DIASTOLIC BLOOD PRESSURE: 57 MMHG | OXYGEN SATURATION: 94 % | HEIGHT: 69 IN | HEART RATE: 63 BPM | RESPIRATION RATE: 16 BRPM | SYSTOLIC BLOOD PRESSURE: 104 MMHG | BODY MASS INDEX: 27.11 KG/M2 | TEMPERATURE: 98.1 F | WEIGHT: 183 LBS

## 2025-01-20 DIAGNOSIS — C7A.092: ICD-10-CM

## 2025-01-20 DIAGNOSIS — D69.6 THROMBOCYTOPENIA, UNSPECIFIED: ICD-10-CM

## 2025-01-20 DIAGNOSIS — Z85.46 HISTORY OF MALIGNANT NEOPLASM OF PROSTATE: Primary | ICD-10-CM

## 2025-01-20 LAB
HGB BLD-MCNC: 11.7 G/DL (ref 13.3–17.7)
INR PPP: 1.06 (ref 0.85–1.15)
PLATELET # BLD AUTO: 184 10E3/UL (ref 150–450)

## 2025-01-20 PROCEDURE — 85018 HEMOGLOBIN: CPT | Performed by: RADIOLOGY

## 2025-01-20 PROCEDURE — 20225 BONE BIOPSY TROCAR/NDL DEEP: CPT

## 2025-01-20 PROCEDURE — 77012 CT SCAN FOR NEEDLE BIOPSY: CPT

## 2025-01-20 PROCEDURE — 36415 COLL VENOUS BLD VENIPUNCTURE: CPT | Performed by: RADIOLOGY

## 2025-01-20 PROCEDURE — 85610 PROTHROMBIN TIME: CPT | Performed by: RADIOLOGY

## 2025-01-20 PROCEDURE — 88333 PATH CONSLTJ SURG CYTO XM 1: CPT | Mod: TC | Performed by: INTERNAL MEDICINE

## 2025-01-20 PROCEDURE — 88344 IMHCHEM/IMCYTCHM EA MLT ANTB: CPT | Mod: TC | Performed by: INTERNAL MEDICINE

## 2025-01-20 PROCEDURE — 999N000128 HC STATISTIC PERIPHERAL IV START W/O US GUIDANCE

## 2025-01-20 PROCEDURE — 85049 AUTOMATED PLATELET COUNT: CPT | Performed by: RADIOLOGY

## 2025-01-20 PROCEDURE — 250N000011 HC RX IP 250 OP 636: Performed by: RADIOLOGY

## 2025-01-20 RX ORDER — BICALUTAMIDE 50 MG/1
50 TABLET, FILM COATED ORAL AT BEDTIME
COMMUNITY

## 2025-01-20 RX ORDER — FLUMAZENIL 0.1 MG/ML
0.2 INJECTION, SOLUTION INTRAVENOUS
Status: DISCONTINUED | OUTPATIENT
Start: 2025-01-20 | End: 2025-01-21 | Stop reason: HOSPADM

## 2025-01-20 RX ORDER — IBUPROFEN 400 MG/1
400 TABLET, FILM COATED ORAL EVERY 6 HOURS PRN
OUTPATIENT
Start: 2025-01-20

## 2025-01-20 RX ORDER — ACETAMINOPHEN 325 MG/1
650 TABLET ORAL EVERY 4 HOURS PRN
OUTPATIENT
Start: 2025-01-20

## 2025-01-20 RX ORDER — NALOXONE HYDROCHLORIDE 0.4 MG/ML
0.2 INJECTION, SOLUTION INTRAMUSCULAR; INTRAVENOUS; SUBCUTANEOUS
Status: DISCONTINUED | OUTPATIENT
Start: 2025-01-20 | End: 2025-01-21 | Stop reason: HOSPADM

## 2025-01-20 RX ORDER — FENTANYL CITRATE 50 UG/ML
25-50 INJECTION, SOLUTION INTRAMUSCULAR; INTRAVENOUS EVERY 5 MIN PRN
Status: DISCONTINUED | OUTPATIENT
Start: 2025-01-20 | End: 2025-01-21 | Stop reason: HOSPADM

## 2025-01-20 RX ORDER — NALOXONE HYDROCHLORIDE 0.4 MG/ML
0.4 INJECTION, SOLUTION INTRAMUSCULAR; INTRAVENOUS; SUBCUTANEOUS
Status: DISCONTINUED | OUTPATIENT
Start: 2025-01-20 | End: 2025-01-21 | Stop reason: HOSPADM

## 2025-01-20 RX ADMIN — FENTANYL CITRATE 25 MCG: 50 INJECTION, SOLUTION INTRAMUSCULAR; INTRAVENOUS at 08:55

## 2025-01-20 RX ADMIN — MIDAZOLAM HYDROCHLORIDE 0.5 MG: 1 INJECTION, SOLUTION INTRAMUSCULAR; INTRAVENOUS at 08:52

## 2025-01-20 NOTE — PRE-PROCEDURE
GENERAL PRE-PROCEDURE:   Procedure:  CT guided left pelvic bone biopsy with moderate sedation  Date/Time:  1/20/2025 8:25 AM    Written consent obtained?: Yes    Risks and benefits: Risks, benefits and alternatives were discussed    Consent given by:  Patient  Patient states understanding of procedure being performed: Yes    Patient's understanding of procedure matches consent: Yes    Procedure consent matches procedure scheduled: Yes    Expected level of sedation:  Moderate  Appropriately NPO:  Yes  ASA Class:  2  Mallampati  :  Grade 2- soft palate, base of uvula, tonsillar pillars, and portion of posterior pharyngeal wall visible  Lungs:  Lungs clear with good breath sounds bilaterally  Heart:  Normal heart sounds and rate  History & Physical reviewed:  History and physical reviewed and no updates needed  Statement of review:  I have reviewed the lab findings, diagnostic data, medications, and the plan for sedation

## 2025-01-20 NOTE — INTERVAL H&P NOTE
"I have reviewed the surgical (or preoperative) H&P that is linked to this encounter, and examined the patient. There are no significant changes    Clinical Conditions Present on Arrival:  Clinically Significant Risk Factors Present on Admission                  # Drug Induced Platelet Defect: home medication list includes an antiplatelet medication      # Overweight: Estimated body mass index is 27.02 kg/m  as calculated from the following:    Height as of this encounter: 1.753 m (5' 9\").    Weight as of this encounter: 83 kg (183 lb).       "

## 2025-01-20 NOTE — SEDATION DOCUMENTATION
Patient Name: Vannesa Alvarez  Medical Record Number: [id number]  Today's Date: 1/20/2025    Procedure: Deep Bone BX  Proceduralist: Dr. Batista    Procedure Start: 0857  Procedure end: 0911  Sedation medications administered: 0.5 mg midazolam and 25 mcg fentanyl   Sedation time: 14 minutes      Other Notes: Pt arrived to CT room 1 from  Pre/post bay 5 . Consent reviewed. Pt denies any questions or concerns regarding procedure. Pt positioned Supine and monitored per protocol. Pt tolerated procedure without any noted complications. VSS on Transfer. Pt transferred back to  Pre/post [place] 5 .

## 2025-01-21 PROCEDURE — 88333 PATH CONSLTJ SURG CYTO XM 1: CPT | Mod: 26 | Performed by: PATHOLOGY

## 2025-01-21 PROCEDURE — 88341 IMHCHEM/IMCYTCHM EA ADD ANTB: CPT | Mod: 26 | Performed by: PATHOLOGY

## 2025-01-21 PROCEDURE — 88360 TUMOR IMMUNOHISTOCHEM/MANUAL: CPT | Mod: 26 | Performed by: PATHOLOGY

## 2025-01-21 PROCEDURE — 81455 SO/HL 51/>GSAP DNA/DNA&RNA: CPT | Performed by: INTERNAL MEDICINE

## 2025-01-21 PROCEDURE — G0452 MOLECULAR PATHOLOGY INTERPR: HCPCS | Mod: 26 | Performed by: PATHOLOGY

## 2025-01-21 PROCEDURE — 88344 IMHCHEM/IMCYTCHM EA MLT ANTB: CPT | Mod: 26 | Performed by: PATHOLOGY

## 2025-01-21 PROCEDURE — 88342 IMHCHEM/IMCYTCHM 1ST ANTB: CPT | Mod: 26 | Performed by: PATHOLOGY

## 2025-01-21 PROCEDURE — 88305 TISSUE EXAM BY PATHOLOGIST: CPT | Mod: 26 | Performed by: PATHOLOGY

## 2025-01-22 LAB
PATH REPORT.ADDENDUM SPEC: ABNORMAL
PATH REPORT.COMMENTS IMP SPEC: ABNORMAL
PATH REPORT.COMMENTS IMP SPEC: YES
PATH REPORT.FINAL DX SPEC: ABNORMAL
PATH REPORT.GROSS SPEC: ABNORMAL
PATH REPORT.MICROSCOPIC SPEC OTHER STN: ABNORMAL
PATH REPORT.RELEVANT HX SPEC: ABNORMAL
PHOTO IMAGE: ABNORMAL

## 2025-02-24 ENCOUNTER — DOCUMENTATION ONLY (OUTPATIENT)
Dept: OTHER | Facility: CLINIC | Age: OVER 89
End: 2025-02-24
Payer: MEDICARE

## 2025-05-03 ENCOUNTER — NURSE TRIAGE (OUTPATIENT)
Dept: NURSING | Facility: CLINIC | Age: OVER 89
End: 2025-05-03

## 2025-05-03 ENCOUNTER — ANCILLARY PROCEDURE (OUTPATIENT)
Dept: GENERAL RADIOLOGY | Facility: CLINIC | Age: OVER 89
End: 2025-05-03
Attending: PHYSICIAN ASSISTANT
Payer: COMMERCIAL

## 2025-05-03 ENCOUNTER — OFFICE VISIT (OUTPATIENT)
Dept: URGENT CARE | Facility: URGENT CARE | Age: OVER 89
End: 2025-05-03
Payer: COMMERCIAL

## 2025-05-03 VITALS
RESPIRATION RATE: 18 BRPM | WEIGHT: 175 LBS | BODY MASS INDEX: 25.84 KG/M2 | HEART RATE: 59 BPM | SYSTOLIC BLOOD PRESSURE: 122 MMHG | OXYGEN SATURATION: 95 % | DIASTOLIC BLOOD PRESSURE: 68 MMHG | TEMPERATURE: 97.4 F

## 2025-05-03 DIAGNOSIS — R21 RASH: ICD-10-CM

## 2025-05-03 DIAGNOSIS — R05.1 ACUTE COUGH: ICD-10-CM

## 2025-05-03 DIAGNOSIS — T83.511A URINARY TRACT INFECTION ASSOCIATED WITH CATHETERIZATION OF URINARY TRACT, UNSPECIFIED INDWELLING URINARY CATHETER TYPE, INITIAL ENCOUNTER: ICD-10-CM

## 2025-05-03 DIAGNOSIS — N39.0 URINARY TRACT INFECTION ASSOCIATED WITH CATHETERIZATION OF URINARY TRACT, UNSPECIFIED INDWELLING URINARY CATHETER TYPE, INITIAL ENCOUNTER: ICD-10-CM

## 2025-05-03 DIAGNOSIS — J18.9 PNEUMONIA OF RIGHT MIDDLE LOBE DUE TO INFECTIOUS ORGANISM: Primary | ICD-10-CM

## 2025-05-03 PROCEDURE — 99417 PROLNG OP E/M EACH 15 MIN: CPT | Performed by: PHYSICIAN ASSISTANT

## 2025-05-03 PROCEDURE — 3078F DIAST BP <80 MM HG: CPT | Performed by: PHYSICIAN ASSISTANT

## 2025-05-03 PROCEDURE — 3074F SYST BP LT 130 MM HG: CPT | Performed by: PHYSICIAN ASSISTANT

## 2025-05-03 PROCEDURE — 99215 OFFICE O/P EST HI 40 MIN: CPT | Performed by: PHYSICIAN ASSISTANT

## 2025-05-03 PROCEDURE — 71046 X-RAY EXAM CHEST 2 VIEWS: CPT | Mod: TC | Performed by: RADIOLOGY

## 2025-05-03 RX ORDER — GRANULES FOR ORAL 3 G/1
3 POWDER ORAL ONCE
Qty: 1 PACKET | Refills: 0 | Status: SHIPPED | OUTPATIENT
Start: 2025-05-03 | End: 2025-05-03

## 2025-05-03 RX ORDER — PREDNISONE 20 MG/1
40 TABLET ORAL DAILY
Qty: 10 TABLET | Refills: 0 | Status: SHIPPED | OUTPATIENT
Start: 2025-05-03 | End: 2025-05-08

## 2025-05-03 RX ORDER — DOXYCYCLINE 100 MG/1
100 CAPSULE ORAL 2 TIMES DAILY
Qty: 20 CAPSULE | Refills: 0 | Status: SHIPPED | OUTPATIENT
Start: 2025-05-03 | End: 2025-05-13

## 2025-05-03 NOTE — TELEPHONE ENCOUNTER
Nurse Triage JUAN CARLOS Hendrix    Is this a 2nd Level Triage? NO    Situation:   Spoke with son, Michael, about 89 yr old Vannesa.  Vannesa gave permission to speak with son.  Patient has parkinson's and is difficult to understand.  Diagnosed with a UTI and started Cephalexin 500 mg yesterday afternoon/evening.  Through out the evening complained of itching.  At bed time his skin looked red; mainly shoulder, back and starting on the head.  Looked like sunburn.  Skin this morning is not as red and itching has subsided.  Patient has not been given another dose of medication.  Denies problems swallowing or breathing at this time.  temp via ear thermometer is 96.6    Background:   Seen at [facility] yesterday, 5/2/25.  Hx of Parkinsons      Assessment: evaluation or provider response needed.    Protocol Recommended Disposition:   See HCP Within 4 Hours (Or PCP Triage)    Recommendation:   Will message  team high priority about possible medication reaction to Cephalexin.   Advised to stop medication for now.  Advised calling 911 if swallowing or breathing difficulties occur.  Son voiced understanding.    Marycruz Lewis RN  [facility]  Reason for Disposition   [1] Taking new prescription medication AND [2] rash within 4 hours of 1st dose    Additional Information   Negative: [1] Life-threatening reaction (anaphylaxis) in the past to the same drug AND [2] < 2 hours since exposure   Negative: Difficulty breathing or wheezing   Negative: [1] Hoarseness or cough AND [2] started soon after 1st dose of drug   Negative: [1] Swollen tongue AND [2] started soon after 1st dose of drug   Negative: [1] Purple or blood-colored rash (spots or dots) AND [2] fever   Negative: Sounds like a life-threatening emergency to the triager   Negative: Rash is only on 1 part of the body (localized)   Negative: Taking new non-prescription (OTC) antihistamine, decongestant, ear drops, eye drops, or other OTC cough/cold medicine   Negative:  Taking new prescription antihistamine, allergy medicine, asthma medicine, eye drops, ear drops or nose drops   Negative: Rash started more than 3 days after stopping new prescription medicine   Negative: Swollen tongue   Negative: [1] Widespread hives AND [2] onset < 2 hours of exposure to 1st dose of drug   Negative: Fever   Negative: Patient sounds very sick or weak to the triager   Negative: [1] Purple or blood-colored rash (spots or dots) AND [2] no fever AND [3] sounds well to triager    Protocols used: Rash - Widespread On Drugs-A-AH

## 2025-05-03 NOTE — PROGRESS NOTES
"  Assessment & Plan     Pneumonia of right middle lobe due to infectious organism  Will treat with doxycyline and prednisone burst. Monitor symptoms closely. Get plenty of rest and push fluids. Would recommend patient be seen in the ED with worsening symptoms in 48 hours, can return to clinic if symptoms are persisting but not worsening; otherwise follow up with primary care provider in 4-6 weeks for a recheck. Patient and son agree with plan.     - doxycycline monohydrate (MONODOX) 100 MG capsule; Take 1 capsule (100 mg) by mouth 2 times daily for 10 days.  - predniSONE (DELTASONE) 20 MG tablet; Take 2 tablets (40 mg) by mouth daily for 5 days.    Urinary tract infection associated with catheterization of urinary tract, unspecified indwelling urinary catheter type, initial encounter  Has numerous allergies listed, rash was likely related to bladder cancer infusions but will stop the keflex and treat with a one time dose of fosfomycin to simplify treatment regimen. Patient and son agree with plan.     - fosfomycin (MONUROL) 3 g Packet; Take 1 packet (3 g) by mouth once for 1 dose.    Acute cough    - XR Chest 2 Views; Future    Rash  Likely related to infusions from bladder cancer treatment. Continue to monitor. Symptoms seem better today. The prednisone burst will help with the itching. Would recommend close follow up with oncology.       BMI  Estimated body mass index is 25.84 kg/m  as calculated from the following:    Height as of 1/20/25: 1.753 m (5' 9\").    Weight as of this encounter: 79.4 kg (175 lb).             60 minutes spent by me on the date of the encounter doing chart review, history and exam, documentation and further activities per the note            Subjective   Chief Complaint   Patient presents with    Medication Problem     In yesterday dx with uti, had cephalexin, started feeling itchy and as son was getting him ready for bed he noticed his skin was getting red. Mainly shoulders, back and some " on front.           5/3/2025     2:48 PM   Additional Questions   Roomed by Carolina MENDES   Accompanied by Son-Michael WHEELER      Derm problem     Onset of symptoms was 3-4 hours after taking keflex  Course of illness is improving.    Severity mild  Current and Associated symptoms: red, itchy rash started 3-4 hours after taking keflex yesterday   Treatment measures tried include None tried.  Predisposing factors include has been getting enfortumab infusions for bladder cancer    Sees [place] [facility] for bladder cancer and was about to get the 5th dose of Enfortumab when the oncologist noticed rash on right arm and had them hold treatment for 3 weeks. He was instead given IV fluids, dexamethasone, and famotidine. Patient was seen the next day for possible bladder infection and was started on keflex which he has had many times. About 3-4 hours later he developed a diffuse sunburn type rash around his neck and on his back. There is some itching. No swelling around the mouth.     Patient also reports wet cough that has been ongoing. He coughed frequently during visit and coughed up yellow sputum.                   Objective    /68   Pulse 59   Temp 97.4  F (36.3  C) (Tympanic)   Resp 18   Wt 79.4 kg (175 lb)   SpO2 95%   BMI 25.84 kg/m    Body mass index is 25.84 kg/m .      Physical Exam  Constitutional:       General: He is not in acute distress.     Appearance: He is well-developed.   HENT:      Head: Normocephalic and atraumatic.      Right Ear: Tympanic membrane and ear canal normal.      Left Ear: Tympanic membrane and ear canal normal.   Eyes:      Conjunctiva/sclera: Conjunctivae normal.   Cardiovascular:      Rate and Rhythm: Normal rate and regular rhythm.   Pulmonary:      Effort: Pulmonary effort is normal.      Breath sounds: Normal breath sounds.      Comments: Occasional wet sounding cough   Skin:     General: Skin is warm and dry.      Findings: Rash present.             Comments: Diffusely mildly red  rash around neck and on back, no lesions, blisters, wounds, hives, or signs of infection.    Psychiatric:         Behavior: Behavior normal.            CXR - Reviewed and interpreted by me: infiltrate right lobe         Signed Electronically by: Tila Rose PA-C

## 2025-07-06 ENCOUNTER — HEALTH MAINTENANCE LETTER (OUTPATIENT)
Age: OVER 89
End: 2025-07-06

## (undated) DEVICE — PACK OCULOPLATIC SEN15OCFSD

## (undated) DEVICE — NDL 30GA 1" 305128

## (undated) DEVICE — SU VICRYL 4-0 P-3 18" UND J494G

## (undated) DEVICE — GLOVE BIOGEL PI MICRO SZ 6.5 48565

## (undated) DEVICE — SPONGE RAY-TEC 4X8" 7318

## (undated) DEVICE — PEN MARKING SKIN W/LABELS 31145884

## (undated) DEVICE — SU PLAIN FAST ABSORB 5-0 PC-1 18" 1915G

## (undated) DEVICE — ESU PENCIL SMOKE EVAC W/ROCKER SWITCH 0703-047-000

## (undated) DEVICE — DRSG GAUZE 4X4" TRAY

## (undated) DEVICE — SOL WATER IRRIG 1000ML BOTTLE 2F7114

## (undated) DEVICE — SOL WATER IRRIG 1000ML BOTTLE 07139-09

## (undated) DEVICE — ESU NDL COLORADO MICRO 3CM STR N103A

## (undated) DEVICE — SYR 10ML LL W/O NDL

## (undated) DEVICE — ESU EYE HIGH TEMP 65410-183

## (undated) DEVICE — PEN MARKING SKIN FINE 31145942

## (undated) RX ORDER — FENTANYL CITRATE 50 UG/ML
INJECTION, SOLUTION INTRAMUSCULAR; INTRAVENOUS
Status: DISPENSED
Start: 2025-01-20

## (undated) RX ORDER — ERYTHROMYCIN 5 MG/G
OINTMENT OPHTHALMIC
Status: DISPENSED
Start: 2023-11-15

## (undated) RX ORDER — LIDOCAINE HYDROCHLORIDE AND EPINEPHRINE 10; 10 MG/ML; UG/ML
INJECTION, SOLUTION INFILTRATION; PERINEURAL
Status: DISPENSED
Start: 2023-11-15

## (undated) RX ORDER — FENTANYL CITRATE 50 UG/ML
INJECTION, SOLUTION INTRAMUSCULAR; INTRAVENOUS
Status: DISPENSED
Start: 2023-11-15

## (undated) RX ORDER — BUPIVACAINE HYDROCHLORIDE AND EPINEPHRINE 5; 5 MG/ML; UG/ML
INJECTION, SOLUTION EPIDURAL; INTRACAUDAL; PERINEURAL
Status: DISPENSED
Start: 2023-11-15

## (undated) RX ORDER — GABAPENTIN 100 MG/1
CAPSULE ORAL
Status: DISPENSED
Start: 2023-11-15

## (undated) RX ORDER — ACETAMINOPHEN 325 MG/1
TABLET ORAL
Status: DISPENSED
Start: 2023-11-15